# Patient Record
Sex: MALE | Race: WHITE | NOT HISPANIC OR LATINO | Employment: OTHER | URBAN - NONMETROPOLITAN AREA
[De-identification: names, ages, dates, MRNs, and addresses within clinical notes are randomized per-mention and may not be internally consistent; named-entity substitution may affect disease eponyms.]

---

## 2018-08-18 ENCOUNTER — APPOINTMENT (OUTPATIENT)
Dept: CT IMAGING | Facility: HOSPITAL | Age: 58
End: 2018-08-18

## 2018-08-18 ENCOUNTER — APPOINTMENT (OUTPATIENT)
Dept: GENERAL RADIOLOGY | Facility: HOSPITAL | Age: 58
End: 2018-08-18

## 2018-08-18 ENCOUNTER — HOSPITAL ENCOUNTER (EMERGENCY)
Facility: HOSPITAL | Age: 58
Discharge: HOME | End: 2018-08-18
Attending: FAMILY MEDICINE | Admitting: FAMILY MEDICINE

## 2018-08-18 VITALS
WEIGHT: 185 LBS | HEIGHT: 73 IN | BODY MASS INDEX: 24.52 KG/M2 | SYSTOLIC BLOOD PRESSURE: 139 MMHG | RESPIRATION RATE: 16 BRPM | TEMPERATURE: 97.9 F | OXYGEN SATURATION: 97 % | HEART RATE: 62 BPM | DIASTOLIC BLOOD PRESSURE: 93 MMHG

## 2018-08-18 DIAGNOSIS — R07.9 CHEST PAIN IN ADULT: Primary | ICD-10-CM

## 2018-08-18 DIAGNOSIS — R07.89 MUSCULOSKELETAL CHEST PAIN: ICD-10-CM

## 2018-08-18 LAB
ALBUMIN SERPL-MCNC: 3.9 G/DL (ref 3.5–5)
ALBUMIN/GLOB SERPL: 1.4 G/DL (ref 1.5–2.5)
ALP SERPL-CCNC: 51 U/L (ref 40–129)
ALT SERPL W P-5'-P-CCNC: 21 U/L (ref 10–44)
ANION GAP SERPL CALCULATED.3IONS-SCNC: 2.5 MMOL/L (ref 3.6–11.2)
AST SERPL-CCNC: 21 U/L (ref 10–34)
BASOPHILS # BLD AUTO: 0.05 10*3/MM3 (ref 0–0.3)
BASOPHILS NFR BLD AUTO: 0.4 % (ref 0–2)
BILIRUB SERPL-MCNC: 0.3 MG/DL (ref 0.2–1.8)
BNP SERPL-MCNC: 123 PG/ML (ref 0–100)
BUN BLD-MCNC: 10 MG/DL (ref 7–21)
BUN/CREAT SERPL: 12.8 (ref 7–25)
CALCIUM SPEC-SCNC: 9.3 MG/DL (ref 7.7–10)
CHLORIDE SERPL-SCNC: 110 MMOL/L (ref 99–112)
CO2 SERPL-SCNC: 28.5 MMOL/L (ref 24.3–31.9)
CREAT BLD-MCNC: 0.78 MG/DL (ref 0.43–1.29)
CRP SERPL-MCNC: 0.94 MG/DL (ref 0–0.99)
D DIMER PPP FEU-MCNC: 0.53 MCGFEU/ML (ref 0–0.5)
D-LACTATE SERPL-SCNC: 1.3 MMOL/L (ref 0.5–2)
DEPRECATED RDW RBC AUTO: 48.3 FL (ref 37–54)
EOSINOPHIL # BLD AUTO: 0.58 10*3/MM3 (ref 0–0.7)
EOSINOPHIL NFR BLD AUTO: 4.3 % (ref 0–5)
ERYTHROCYTE [DISTWIDTH] IN BLOOD BY AUTOMATED COUNT: 14.6 % (ref 11.5–14.5)
GFR SERPL CREATININE-BSD FRML MDRD: 102 ML/MIN/1.73
GLOBULIN UR ELPH-MCNC: 2.8 GM/DL
GLUCOSE BLD-MCNC: 133 MG/DL (ref 70–110)
HCT VFR BLD AUTO: 37.5 % (ref 42–52)
HGB BLD-MCNC: 11.7 G/DL (ref 14–18)
HOLD SPECIMEN: NORMAL
HOLD SPECIMEN: NORMAL
IMM GRANULOCYTES # BLD: 0.05 10*3/MM3 (ref 0–0.03)
IMM GRANULOCYTES NFR BLD: 0.4 % (ref 0–0.5)
LYMPHOCYTES # BLD AUTO: 2.53 10*3/MM3 (ref 1–3)
LYMPHOCYTES NFR BLD AUTO: 18.6 % (ref 21–51)
MCH RBC QN AUTO: 29.6 PG (ref 27–33)
MCHC RBC AUTO-ENTMCNC: 31.2 G/DL (ref 33–37)
MCV RBC AUTO: 94.9 FL (ref 80–94)
MONOCYTES # BLD AUTO: 0.7 10*3/MM3 (ref 0.1–0.9)
MONOCYTES NFR BLD AUTO: 5.1 % (ref 0–10)
NEUTROPHILS # BLD AUTO: 9.72 10*3/MM3 (ref 1.4–6.5)
NEUTROPHILS NFR BLD AUTO: 71.2 % (ref 30–70)
OSMOLALITY SERPL CALC.SUM OF ELEC: 282.2 MOSM/KG (ref 273–305)
PLATELET # BLD AUTO: 270 10*3/MM3 (ref 130–400)
PMV BLD AUTO: 10.9 FL (ref 6–10)
POTASSIUM BLD-SCNC: 4.2 MMOL/L (ref 3.5–5.3)
PROT SERPL-MCNC: 6.7 G/DL (ref 6–8)
RBC # BLD AUTO: 3.95 10*6/MM3 (ref 4.7–6.1)
SODIUM BLD-SCNC: 141 MMOL/L (ref 135–153)
TROPONIN I SERPL-MCNC: <0.006 NG/ML
TROPONIN I SERPL-MCNC: <0.006 NG/ML
WBC NRBC COR # BLD: 13.63 10*3/MM3 (ref 4.5–12.5)
WHOLE BLOOD HOLD SPECIMEN: NORMAL

## 2018-08-18 PROCEDURE — 93005 ELECTROCARDIOGRAM TRACING: CPT | Performed by: FAMILY MEDICINE

## 2018-08-18 PROCEDURE — 71045 X-RAY EXAM CHEST 1 VIEW: CPT | Performed by: RADIOLOGY

## 2018-08-18 PROCEDURE — 99285 EMERGENCY DEPT VISIT HI MDM: CPT

## 2018-08-18 PROCEDURE — 80053 COMPREHEN METABOLIC PANEL: CPT | Performed by: FAMILY MEDICINE

## 2018-08-18 PROCEDURE — 25010000002 KETOROLAC TROMETHAMINE PER 15 MG

## 2018-08-18 PROCEDURE — 94640 AIRWAY INHALATION TREATMENT: CPT

## 2018-08-18 PROCEDURE — 87040 BLOOD CULTURE FOR BACTERIA: CPT | Performed by: FAMILY MEDICINE

## 2018-08-18 PROCEDURE — 87150 DNA/RNA AMPLIFIED PROBE: CPT | Performed by: FAMILY MEDICINE

## 2018-08-18 PROCEDURE — 25010000002 METHYLPREDNISOLONE PER 125 MG: Performed by: FAMILY MEDICINE

## 2018-08-18 PROCEDURE — 0 IOPAMIDOL PER 1 ML: Performed by: FAMILY MEDICINE

## 2018-08-18 PROCEDURE — 93005 ELECTROCARDIOGRAM TRACING: CPT | Performed by: EMERGENCY MEDICINE

## 2018-08-18 PROCEDURE — 84484 ASSAY OF TROPONIN QUANT: CPT | Performed by: FAMILY MEDICINE

## 2018-08-18 PROCEDURE — 87147 CULTURE TYPE IMMUNOLOGIC: CPT | Performed by: FAMILY MEDICINE

## 2018-08-18 PROCEDURE — 85379 FIBRIN DEGRADATION QUANT: CPT | Performed by: FAMILY MEDICINE

## 2018-08-18 PROCEDURE — 71045 X-RAY EXAM CHEST 1 VIEW: CPT

## 2018-08-18 PROCEDURE — 85025 COMPLETE CBC W/AUTO DIFF WBC: CPT | Performed by: FAMILY MEDICINE

## 2018-08-18 PROCEDURE — 96374 THER/PROPH/DIAG INJ IV PUSH: CPT

## 2018-08-18 PROCEDURE — 83880 ASSAY OF NATRIURETIC PEPTIDE: CPT | Performed by: FAMILY MEDICINE

## 2018-08-18 PROCEDURE — 87186 SC STD MICRODIL/AGAR DIL: CPT | Performed by: FAMILY MEDICINE

## 2018-08-18 PROCEDURE — 96375 TX/PRO/DX INJ NEW DRUG ADDON: CPT

## 2018-08-18 PROCEDURE — 87077 CULTURE AEROBIC IDENTIFY: CPT | Performed by: FAMILY MEDICINE

## 2018-08-18 PROCEDURE — 71275 CT ANGIOGRAPHY CHEST: CPT

## 2018-08-18 PROCEDURE — 71275 CT ANGIOGRAPHY CHEST: CPT | Performed by: RADIOLOGY

## 2018-08-18 PROCEDURE — 94799 UNLISTED PULMONARY SVC/PX: CPT

## 2018-08-18 PROCEDURE — 83605 ASSAY OF LACTIC ACID: CPT | Performed by: FAMILY MEDICINE

## 2018-08-18 PROCEDURE — 71100 X-RAY EXAM RIBS UNI 2 VIEWS: CPT

## 2018-08-18 PROCEDURE — 71100 X-RAY EXAM RIBS UNI 2 VIEWS: CPT | Performed by: RADIOLOGY

## 2018-08-18 PROCEDURE — 86140 C-REACTIVE PROTEIN: CPT | Performed by: FAMILY MEDICINE

## 2018-08-18 RX ORDER — OXYCODONE AND ACETAMINOPHEN 10; 325 MG/1; MG/1
1 TABLET ORAL ONCE
Status: COMPLETED | OUTPATIENT
Start: 2018-08-18 | End: 2018-08-18

## 2018-08-18 RX ORDER — METHYLPREDNISOLONE SODIUM SUCCINATE 125 MG/2ML
125 INJECTION, POWDER, LYOPHILIZED, FOR SOLUTION INTRAMUSCULAR; INTRAVENOUS ONCE
Status: COMPLETED | OUTPATIENT
Start: 2018-08-18 | End: 2018-08-18

## 2018-08-18 RX ORDER — LIDOCAINE 50 MG/G
1 PATCH TOPICAL ONCE
Status: DISCONTINUED | OUTPATIENT
Start: 2018-08-18 | End: 2018-08-18 | Stop reason: HOSPADM

## 2018-08-18 RX ORDER — LISINOPRIL 10 MG/1
10 TABLET ORAL DAILY
COMMUNITY

## 2018-08-18 RX ORDER — KETOROLAC TROMETHAMINE 30 MG/ML
30 INJECTION, SOLUTION INTRAMUSCULAR; INTRAVENOUS ONCE
Status: COMPLETED | OUTPATIENT
Start: 2018-08-18 | End: 2018-08-18

## 2018-08-18 RX ORDER — KETOROLAC TROMETHAMINE 30 MG/ML
INJECTION, SOLUTION INTRAMUSCULAR; INTRAVENOUS
Status: COMPLETED
Start: 2018-08-18 | End: 2018-08-18

## 2018-08-18 RX ORDER — KETOROLAC TROMETHAMINE 30 MG/ML
30 INJECTION, SOLUTION INTRAMUSCULAR; INTRAVENOUS ONCE
Status: DISCONTINUED | OUTPATIENT
Start: 2018-08-18 | End: 2018-08-18

## 2018-08-18 RX ORDER — IPRATROPIUM BROMIDE AND ALBUTEROL SULFATE 2.5; .5 MG/3ML; MG/3ML
3 SOLUTION RESPIRATORY (INHALATION) ONCE
Status: COMPLETED | OUTPATIENT
Start: 2018-08-18 | End: 2018-08-18

## 2018-08-18 RX ORDER — SODIUM CHLORIDE 0.9 % (FLUSH) 0.9 %
10 SYRINGE (ML) INJECTION AS NEEDED
Status: DISCONTINUED | OUTPATIENT
Start: 2018-08-18 | End: 2018-08-18 | Stop reason: HOSPADM

## 2018-08-18 RX ORDER — SODIUM CHLORIDE 0.9 % (FLUSH) 0.9 %
10 SYRINGE (ML) INJECTION AS NEEDED
Status: DISCONTINUED | OUTPATIENT
Start: 2018-08-18 | End: 2018-08-18

## 2018-08-18 RX ADMIN — IPRATROPIUM BROMIDE AND ALBUTEROL SULFATE 3 ML: .5; 3 SOLUTION RESPIRATORY (INHALATION) at 15:20

## 2018-08-18 RX ADMIN — METHYLPREDNISOLONE SODIUM SUCCINATE 125 MG: 125 INJECTION, POWDER, FOR SOLUTION INTRAMUSCULAR; INTRAVENOUS at 15:50

## 2018-08-18 RX ADMIN — KETOROLAC TROMETHAMINE 30 MG: 30 INJECTION, SOLUTION INTRAMUSCULAR; INTRAVENOUS at 19:16

## 2018-08-18 RX ADMIN — SODIUM CHLORIDE 1000 ML: 9 INJECTION, SOLUTION INTRAVENOUS at 15:51

## 2018-08-18 RX ADMIN — OXYCODONE HYDROCHLORIDE AND ACETAMINOPHEN 1 TABLET: 10; 325 TABLET ORAL at 15:49

## 2018-08-18 RX ADMIN — IOPAMIDOL 90 ML: 755 INJECTION, SOLUTION INTRAVENOUS at 17:47

## 2018-08-18 RX ADMIN — LIDOCAINE 1 PATCH: 50 PATCH CUTANEOUS at 16:00

## 2018-08-18 NOTE — ED NOTES
Faxed outpatient stress test to scheduling. Copy is on the chart. Verified patients phone number.      Symes, Heather  08/18/18 3547

## 2018-08-18 NOTE — ED NOTES
Pt left the ED with stress test order form in hand with verbalized understanding of follow up instructions.      Katya Narvaez RN  08/18/18 [id number]

## 2018-08-18 NOTE — ED NOTES
Pt reports that 2 days ago he picked up a spare tire and after that began to have pain in his left rib and pain in left rib/lung area when he takes in a deep breath. MARK. Family at bedside     Vick, Jeannette Florence, RN  08/18/18 [id number]

## 2018-08-18 NOTE — ED PROVIDER NOTES
Subjective   History of Present Illness  57 y/o M here w/ CP for several hours. Pt states that the pain is worse on the L side, worse with deep breathing or positioning, and has no relieving factors. Pt states he was working at home when the pain started. Pt has no personal h/o MI or CAD but has strong family h/o CAD. Pt has never had stent or previous stress test. Pt has h/o COPD and 40+ py smoking history. Pt denies wheeze or worsening SOB. Pt does not wear O2.   Review of Systems   Constitutional: Negative for chills, fatigue and fever.   Eyes: Negative for photophobia and visual disturbance.   Respiratory: Positive for cough and shortness of breath. Negative for chest tightness and wheezing.    Cardiovascular: Positive for chest pain. Negative for palpitations.   Gastrointestinal: Negative for abdominal distention, abdominal pain, constipation, diarrhea, nausea and vomiting.   Genitourinary: Negative for difficulty urinating and dysuria.   Musculoskeletal: Negative for back pain and neck pain.   Skin: Negative for color change and pallor.   Neurological: Negative for headaches.   Hematological: Does not bruise/bleed easily.   All other systems reviewed and are negative.      Past Medical History:   Diagnosis Date   • COPD (chronic obstructive pulmonary disease) (CMS/[facility])    • Hypertension        Allergies   Allergen Reactions   • Demerol [Meperidine] Anxiety       History reviewed. No pertinent surgical history.    History reviewed. No pertinent family history.    Social History     Social History   • Marital status:      Social History Main Topics   • Smoking status: Current Every Day Smoker     Packs/day: 2.00     Types: Cigarettes   • Alcohol use No   • Drug use: No   • Sexual activity: Defer     Other Topics Concern   • Not on file           Objective   Physical Exam   Constitutional: He is oriented to person, place, and time. He appears well-developed and well-nourished. He is active.   HENT:   Head:  Normocephalic and atraumatic.   Right Ear: Hearing, external ear and ear canal normal.   Left Ear: Hearing, external ear and ear canal normal.   Nose: Nose normal.   Mouth/Throat: Uvula is midline, oropharynx is clear and moist and mucous membranes are normal.   Eyes: Pupils are equal, round, and reactive to light. Conjunctivae, EOM and lids are normal.   Neck: Trachea normal, normal range of motion, full passive range of motion without pain and phonation normal. Neck supple.   Cardiovascular: Normal rate, regular rhythm and normal heart sounds.    Pulmonary/Chest: Effort normal and breath sounds normal. He exhibits tenderness.       Abdominal: Soft. Normal appearance.   Neurological: He is alert and oriented to person, place, and time. GCS eye subscore is 4. GCS verbal subscore is 5. GCS motor subscore is 6.   Skin: Skin is warm, dry and intact. Capillary refill takes less than 2 seconds.   Psychiatric: He has a normal mood and affect. His speech is normal and behavior is normal. Cognition and memory are normal.   Nursing note and vitals reviewed.      Procedures           ED Course  ED Course as of Aug 18 1906   Sat Aug 18, 2018   1559 NSR, 81 bpm. QTc 415ms. No ST segment abnormalities concerning for STEMI. No pathologic blocks or dysrhythmias.  ECG 12 Lead [BR]      ED Course User Index  [BR] Gregg Dominguez MD      1904-HEART of 2, pt given referral for outpt stress test. No rib fracture or PE. Pt told to f/u with PCP, continue OTC NSAIDs, and start ASA 81mg daily.             MDM  Number of Diagnoses or Management Options  Chest pain in adult: new and requires workup  Musculoskeletal chest pain: new and requires workup     Amount and/or Complexity of Data Reviewed  Clinical lab tests: reviewed and ordered  Tests in the radiology section of CPT®: reviewed and ordered  Tests in the medicine section of CPT®: reviewed and ordered  Independent visualization of images, tracings, or specimens: yes    Risk  of Complications, Morbidity, and/or Mortality  Presenting problems: high  Diagnostic procedures: high  Management options: high    Patient Progress  Patient progress: stable        Final diagnoses:   Chest pain in adult   Musculoskeletal chest pain            Gregg Dominguez MD  08/18/18 4046

## 2018-08-20 ENCOUNTER — TRANSCRIBE ORDERS (OUTPATIENT)
Dept: ADMINISTRATIVE | Facility: HOSPITAL | Age: 58
End: 2018-08-20

## 2018-08-20 DIAGNOSIS — R07.9 CHEST PAIN, UNSPECIFIED TYPE: Primary | ICD-10-CM

## 2018-08-20 LAB — BACTERIA BLD CULT: NORMAL

## 2018-08-23 LAB
BACTERIA SPEC AEROBE CULT: ABNORMAL
BACTERIA SPEC AEROBE CULT: NORMAL
GRAM STN SPEC: ABNORMAL
ISOLATED FROM: ABNORMAL

## 2025-01-12 ENCOUNTER — HOSPITAL ENCOUNTER (EMERGENCY)
Facility: HOSPITAL | Age: 65
Discharge: HOME | End: 2025-01-12
Attending: EMERGENCY MEDICINE | Admitting: EMERGENCY MEDICINE
Payer: MEDICARE

## 2025-01-12 ENCOUNTER — APPOINTMENT (OUTPATIENT)
Dept: CT IMAGING | Facility: HOSPITAL | Age: 65
End: 2025-01-12
Payer: MEDICARE

## 2025-01-12 VITALS
BODY MASS INDEX: 23.19 KG/M2 | HEIGHT: 73 IN | TEMPERATURE: 98.5 F | DIASTOLIC BLOOD PRESSURE: 62 MMHG | WEIGHT: 175 LBS | SYSTOLIC BLOOD PRESSURE: 101 MMHG | OXYGEN SATURATION: 92 % | HEART RATE: 66 BPM | RESPIRATION RATE: 14 BRPM

## 2025-01-12 DIAGNOSIS — L03.211 FACIAL CELLULITIS: Primary | ICD-10-CM

## 2025-01-12 LAB
ALBUMIN SERPL-MCNC: 3.7 G/DL (ref 3.5–5.2)
ALBUMIN/GLOB SERPL: 1.3 G/DL
ALP SERPL-CCNC: 61 U/L (ref 39–117)
ALT SERPL W P-5'-P-CCNC: 12 U/L (ref 1–41)
ANION GAP SERPL CALCULATED.3IONS-SCNC: 7.7 MMOL/L (ref 5–15)
AST SERPL-CCNC: 17 U/L (ref 1–40)
BASOPHILS # BLD AUTO: 0.05 10*3/MM3 (ref 0–0.2)
BASOPHILS NFR BLD AUTO: 0.5 % (ref 0–1.5)
BILIRUB SERPL-MCNC: 0.2 MG/DL (ref 0–1.2)
BUN SERPL-MCNC: 12 MG/DL (ref 8–23)
BUN/CREAT SERPL: 13.2 (ref 7–25)
CALCIUM SPEC-SCNC: 9 MG/DL (ref 8.6–10.5)
CHLORIDE SERPL-SCNC: 103 MMOL/L (ref 98–107)
CO2 SERPL-SCNC: 28.3 MMOL/L (ref 22–29)
CREAT SERPL-MCNC: 0.91 MG/DL (ref 0.76–1.27)
CRP SERPL-MCNC: 7.03 MG/DL (ref 0–0.5)
DEPRECATED RDW RBC AUTO: 55.5 FL (ref 37–54)
EGFRCR SERPLBLD CKD-EPI 2021: 93.5 ML/MIN/1.73
EOSINOPHIL # BLD AUTO: 0.32 10*3/MM3 (ref 0–0.4)
EOSINOPHIL NFR BLD AUTO: 3.3 % (ref 0.3–6.2)
ERYTHROCYTE [DISTWIDTH] IN BLOOD BY AUTOMATED COUNT: 15.7 % (ref 12.3–15.4)
GLOBULIN UR ELPH-MCNC: 2.9 GM/DL
GLUCOSE SERPL-MCNC: 100 MG/DL (ref 65–99)
HCT VFR BLD AUTO: 33.6 % (ref 37.5–51)
HGB BLD-MCNC: 10.2 G/DL (ref 13–17.7)
IMM GRANULOCYTES # BLD AUTO: 0.06 10*3/MM3 (ref 0–0.05)
IMM GRANULOCYTES NFR BLD AUTO: 0.6 % (ref 0–0.5)
LYMPHOCYTES # BLD AUTO: 2.07 10*3/MM3 (ref 0.7–3.1)
LYMPHOCYTES NFR BLD AUTO: 21.1 % (ref 19.6–45.3)
MCH RBC QN AUTO: 29.2 PG (ref 26.6–33)
MCHC RBC AUTO-ENTMCNC: 30.4 G/DL (ref 31.5–35.7)
MCV RBC AUTO: 96.3 FL (ref 79–97)
MONOCYTES # BLD AUTO: 0.94 10*3/MM3 (ref 0.1–0.9)
MONOCYTES NFR BLD AUTO: 9.6 % (ref 5–12)
NEUTROPHILS NFR BLD AUTO: 6.35 10*3/MM3 (ref 1.7–7)
NEUTROPHILS NFR BLD AUTO: 64.9 % (ref 42.7–76)
NRBC BLD AUTO-RTO: 0 /100 WBC (ref 0–0.2)
PLATELET # BLD AUTO: 229 10*3/MM3 (ref 140–450)
PMV BLD AUTO: 10.6 FL (ref 6–12)
POTASSIUM SERPL-SCNC: 4.2 MMOL/L (ref 3.5–5.2)
PROT SERPL-MCNC: 6.6 G/DL (ref 6–8.5)
RBC # BLD AUTO: 3.49 10*6/MM3 (ref 4.14–5.8)
SODIUM SERPL-SCNC: 139 MMOL/L (ref 136–145)
WBC NRBC COR # BLD AUTO: 9.79 10*3/MM3 (ref 3.4–10.8)

## 2025-01-12 PROCEDURE — 25010000002 CLINDAMYCIN 600 MG/50ML SOLUTION: Performed by: EMERGENCY MEDICINE

## 2025-01-12 PROCEDURE — 25010000002 ONDANSETRON PER 1 MG: Performed by: EMERGENCY MEDICINE

## 2025-01-12 PROCEDURE — 86140 C-REACTIVE PROTEIN: CPT | Performed by: EMERGENCY MEDICINE

## 2025-01-12 PROCEDURE — 80053 COMPREHEN METABOLIC PANEL: CPT | Performed by: EMERGENCY MEDICINE

## 2025-01-12 PROCEDURE — 96375 TX/PRO/DX INJ NEW DRUG ADDON: CPT

## 2025-01-12 PROCEDURE — 99285 EMERGENCY DEPT VISIT HI MDM: CPT

## 2025-01-12 PROCEDURE — 36415 COLL VENOUS BLD VENIPUNCTURE: CPT

## 2025-01-12 PROCEDURE — 70487 CT MAXILLOFACIAL W/DYE: CPT | Performed by: RADIOLOGY

## 2025-01-12 PROCEDURE — 25010000002 HYDROMORPHONE 1 MG/ML SOLUTION: Performed by: EMERGENCY MEDICINE

## 2025-01-12 PROCEDURE — 96365 THER/PROPH/DIAG IV INF INIT: CPT

## 2025-01-12 PROCEDURE — 25510000001 IOPAMIDOL 61 % SOLUTION: Performed by: EMERGENCY MEDICINE

## 2025-01-12 PROCEDURE — 70487 CT MAXILLOFACIAL W/DYE: CPT

## 2025-01-12 PROCEDURE — 85025 COMPLETE CBC W/AUTO DIFF WBC: CPT | Performed by: EMERGENCY MEDICINE

## 2025-01-12 RX ORDER — IOPAMIDOL 612 MG/ML
100 INJECTION, SOLUTION INTRAVASCULAR
Status: COMPLETED | OUTPATIENT
Start: 2025-01-12 | End: 2025-01-12

## 2025-01-12 RX ORDER — SODIUM CHLORIDE 0.9 % (FLUSH) 0.9 %
10 SYRINGE (ML) INJECTION AS NEEDED
Status: DISCONTINUED | OUTPATIENT
Start: 2025-01-12 | End: 2025-01-12 | Stop reason: HOSPADM

## 2025-01-12 RX ORDER — ONDANSETRON 2 MG/ML
4 INJECTION INTRAMUSCULAR; INTRAVENOUS ONCE
Status: COMPLETED | OUTPATIENT
Start: 2025-01-12 | End: 2025-01-12

## 2025-01-12 RX ORDER — OXYCODONE AND ACETAMINOPHEN 5; 325 MG/1; MG/1
1 TABLET ORAL EVERY 6 HOURS PRN
Status: DISCONTINUED | OUTPATIENT
Start: 2025-01-12 | End: 2025-01-12 | Stop reason: HOSPADM

## 2025-01-12 RX ORDER — HYDROCODONE BITARTRATE AND ACETAMINOPHEN 5; 325 MG/1; MG/1
1 TABLET ORAL EVERY 6 HOURS PRN
Qty: 10 TABLET | Refills: 0 | Status: SHIPPED | OUTPATIENT
Start: 2025-01-12

## 2025-01-12 RX ORDER — CEPHALEXIN 500 MG/1
500 CAPSULE ORAL 3 TIMES DAILY
Qty: 30 CAPSULE | Refills: 0 | Status: SHIPPED | OUTPATIENT
Start: 2025-01-12 | End: 2025-01-22

## 2025-01-12 RX ORDER — DOXYCYCLINE 100 MG/1
100 CAPSULE ORAL 2 TIMES DAILY
Qty: 20 CAPSULE | Refills: 0 | Status: SHIPPED | OUTPATIENT
Start: 2025-01-12 | End: 2025-01-22

## 2025-01-12 RX ORDER — CLINDAMYCIN PHOSPHATE 600 MG/50ML
600 INJECTION, SOLUTION INTRAVENOUS ONCE
Status: COMPLETED | OUTPATIENT
Start: 2025-01-12 | End: 2025-01-12

## 2025-01-12 RX ADMIN — IOPAMIDOL 80 ML: 612 INJECTION, SOLUTION INTRAVENOUS at 15:11

## 2025-01-12 RX ADMIN — HYDROMORPHONE HYDROCHLORIDE 1 MG: 1 INJECTION, SOLUTION INTRAMUSCULAR; INTRAVENOUS; SUBCUTANEOUS at 15:16

## 2025-01-12 RX ADMIN — ONDANSETRON 4 MG: 2 INJECTION INTRAMUSCULAR; INTRAVENOUS at 14:07

## 2025-01-12 RX ADMIN — OXYCODONE HYDROCHLORIDE AND ACETAMINOPHEN 1 TABLET: 5; 325 TABLET ORAL at 17:58

## 2025-01-12 RX ADMIN — CLINDAMYCIN IN 5 PERCENT DEXTROSE 600 MG: 12 INJECTION, SOLUTION INTRAVENOUS at 14:08

## 2025-01-15 NOTE — ED PROVIDER NOTES
Subjective     History provided by:  Patient   used: No    Facial Pain  Location:  Left Lower Face Cellultis  Quality:  5/10 pain that is dull ache and constant  Severity:  Mild  Onset quality:  Gradual  Duration:  2 days  Timing:  Constant  Progression:  Worsening  Chronicity:  New  Associated symptoms: no abdominal pain, no chest pain, no congestion, no cough, no diarrhea, no ear pain, no fatigue, no fever, no headaches, no loss of consciousness, no myalgias, no nausea, no rash, no rhinorrhea, no shortness of breath, no sore throat, no vomiting and no wheezing        Review of Systems   Constitutional:  Negative for activity change, appetite change, chills, diaphoresis, fatigue and fever.   HENT:  Negative for congestion, ear pain, rhinorrhea and sore throat.    Eyes:  Negative for redness.   Respiratory:  Negative for cough, chest tightness, shortness of breath and wheezing.    Cardiovascular:  Negative for chest pain, palpitations and leg swelling.   Gastrointestinal:  Negative for abdominal pain, diarrhea, nausea and vomiting.   Genitourinary:  Negative for dysuria and urgency.   Musculoskeletal:  Negative for arthralgias, back pain, myalgias and neck pain.   Skin:  Negative for pallor, rash and wound.   Neurological:  Negative for dizziness, loss of consciousness, speech difficulty, weakness and headaches.   Psychiatric/Behavioral:  Negative for agitation, behavioral problems, confusion and decreased concentration.    All other systems reviewed and are negative.      Past Medical History:   Diagnosis Date    COPD (chronic obstructive pulmonary disease)     Hypertension        Allergies   Allergen Reactions    Codeine GI Intolerance    Demerol [Meperidine] Anxiety       No past surgical history on file.    No family history on file.    Social History     Socioeconomic History    Marital status:    Tobacco Use    Smoking status: Every Day     Current packs/day: 2.00     Types:  Cigarettes   Substance and Sexual Activity    Alcohol use: No    Drug use: No    Sexual activity: Defer           Objective   Physical Exam  Vitals and nursing note reviewed.   Constitutional:       General: He is not in acute distress.     Appearance: Normal appearance. He is well-developed. He is not toxic-appearing or diaphoretic.   HENT:      Head: Normocephalic and atraumatic.        Right Ear: External ear normal.      Left Ear: External ear normal.      Nose: Nose normal.      Mouth/Throat:      Pharynx: No oropharyngeal exudate.      Tonsils: No tonsillar exudate.   Eyes:      General: Lids are normal.      Conjunctiva/sclera: Conjunctivae normal.      Pupils: Pupils are equal, round, and reactive to light.   Neck:      Thyroid: No thyromegaly.   Cardiovascular:      Rate and Rhythm: Normal rate and regular rhythm.      Pulses: Normal pulses.      Heart sounds: Normal heart sounds, S1 normal and S2 normal.   Pulmonary:      Effort: Pulmonary effort is normal. No tachypnea or respiratory distress.      Breath sounds: Normal breath sounds. No decreased breath sounds, wheezing or rales.   Chest:      Chest wall: No tenderness.   Abdominal:      General: Bowel sounds are normal. There is no distension.      Palpations: Abdomen is soft.      Tenderness: There is no abdominal tenderness. There is no guarding or rebound.   Musculoskeletal:         General: No tenderness or deformity. Normal range of motion.      Cervical back: Full passive range of motion without pain, normal range of motion and neck supple.   Lymphadenopathy:      Cervical: No cervical adenopathy.   Skin:     General: Skin is warm and dry.      Coloration: Skin is not pale.      Findings: No erythema or rash.   Neurological:      Mental Status: He is alert and oriented to person, place, and time.      GCS: GCS eye subscore is 4. GCS verbal subscore is 5. GCS motor subscore is 6.      Cranial Nerves: No cranial nerve deficit.      Sensory: No  sensory deficit.   Psychiatric:         Speech: Speech normal.         Behavior: Behavior normal.         Thought Content: Thought content normal.         Judgment: Judgment normal.         Procedures           ED Course  ED Course as of 01/15/25 1402   Sun Jan 12, 2025   1653 CT Facial Bones With Contrast  IMPRESSION:  Cellulitis involving the soft tissues adjacent to the LEFT mandible  ramus   [ES]      ED Course User Index  [ES] Shday Nugent MD                                                       Medical Decision Making      Final diagnoses:   Facial cellulitis       ED Disposition  ED Disposition       ED Disposition   Discharge    Condition   Stable    Comment   --               Reynaldo Rodriguez MD  [address] [facility] [address]  [facility] [address]  [phone]    Schedule an appointment as soon as possible for a visit in 1 day  EVALUATE         Medication List        New Prescriptions      cephalexin 500 MG capsule  Commonly known as: KEFLEX  Take 1 capsule by mouth 3 (Three) Times a Day for 10 days.     doxycycline 100 MG capsule  Commonly known as: MONODOX  Take 1 capsule by mouth 2 (Two) Times a Day for 10 days.     HYDROcodone-acetaminophen 5-325 MG per tablet  Commonly known as: NORCO  Take 1 tablet by mouth Every 6 (Six) Hours As Needed for Severe Pain.               Where to Get Your Medications        These medications were sent to [facility] #[id number] - [address] AT [facility] [address] [facility] - [phone]  - [phone] [address] [address]      Phone: [phone]   cephalexin 500 MG capsule  doxycycline 100 MG capsule  HYDROcodone-acetaminophen 5-325 MG per tablet            Shady Nugent MD  01/15/25 1402

## 2025-01-29 ENCOUNTER — APPOINTMENT (OUTPATIENT)
Dept: GENERAL RADIOLOGY | Facility: HOSPITAL | Age: 65
End: 2025-01-29
Payer: MEDICARE

## 2025-01-29 ENCOUNTER — APPOINTMENT (OUTPATIENT)
Dept: CT IMAGING | Facility: HOSPITAL | Age: 65
End: 2025-01-29
Payer: MEDICARE

## 2025-01-29 ENCOUNTER — APPOINTMENT (OUTPATIENT)
Dept: ULTRASOUND IMAGING | Facility: HOSPITAL | Age: 65
End: 2025-01-29
Payer: MEDICARE

## 2025-01-29 ENCOUNTER — HOSPITAL ENCOUNTER (INPATIENT)
Facility: HOSPITAL | Age: 65
LOS: 3 days | Discharge: HOME | End: 2025-02-01
Admitting: INTERNAL MEDICINE
Payer: MEDICARE

## 2025-01-29 DIAGNOSIS — I50.9 ACUTE CONGESTIVE HEART FAILURE, UNSPECIFIED HEART FAILURE TYPE: Primary | ICD-10-CM

## 2025-01-29 DIAGNOSIS — J96.01 ACUTE RESPIRATORY FAILURE WITH HYPOXIA: ICD-10-CM

## 2025-01-29 DIAGNOSIS — G62.9 PERIPHERAL POLYNEUROPATHY: ICD-10-CM

## 2025-01-29 DIAGNOSIS — B37.0 THRUSH: ICD-10-CM

## 2025-01-29 LAB
A-A DO2: 36.6 MMHG (ref 0–300)
ALBUMIN SERPL-MCNC: 3.7 G/DL (ref 3.5–5.2)
ALBUMIN/GLOB SERPL: 1.2 G/DL
ALP SERPL-CCNC: 61 U/L (ref 39–117)
ALT SERPL W P-5'-P-CCNC: 12 U/L (ref 1–41)
ANION GAP SERPL CALCULATED.3IONS-SCNC: 8.9 MMOL/L (ref 5–15)
APTT PPP: 31.9 SECONDS (ref 24.5–35.9)
ARTERIAL PATENCY WRIST A: POSITIVE
AST SERPL-CCNC: 19 U/L (ref 1–40)
ATMOSPHERIC PRESS: 726 MMHG
BASE EXCESS BLDA CALC-SCNC: 1.6 MMOL/L (ref 0–2)
BASOPHILS # BLD AUTO: 0.07 10*3/MM3 (ref 0–0.2)
BASOPHILS NFR BLD AUTO: 0.4 % (ref 0–1.5)
BDY SITE: ABNORMAL
BILIRUB SERPL-MCNC: 0.4 MG/DL (ref 0–1.2)
BILIRUB UR QL STRIP: NEGATIVE
BUN SERPL-MCNC: 16 MG/DL (ref 8–23)
BUN/CREAT SERPL: 20.5 (ref 7–25)
CALCIUM SPEC-SCNC: 8.8 MG/DL (ref 8.6–10.5)
CHLORIDE SERPL-SCNC: 108 MMOL/L (ref 98–107)
CLARITY UR: CLEAR
CO2 BLDA-SCNC: 28.8 MMOL/L (ref 22–33)
CO2 SERPL-SCNC: 24.1 MMOL/L (ref 22–29)
COHGB MFR BLD: 6.9 % (ref 0–5)
COLOR UR: YELLOW
CREAT SERPL-MCNC: 0.78 MG/DL (ref 0.76–1.27)
CRP SERPL-MCNC: 4.05 MG/DL (ref 0–0.5)
D-LACTATE SERPL-SCNC: 0.6 MMOL/L (ref 0.5–2)
DEPRECATED RDW RBC AUTO: 58.8 FL (ref 37–54)
EGFRCR SERPLBLD CKD-EPI 2021: 99 ML/MIN/1.73
EOSINOPHIL # BLD AUTO: 0.33 10*3/MM3 (ref 0–0.4)
EOSINOPHIL NFR BLD AUTO: 1.9 % (ref 0.3–6.2)
ERYTHROCYTE [DISTWIDTH] IN BLOOD BY AUTOMATED COUNT: 16.8 % (ref 12.3–15.4)
ERYTHROCYTE [SEDIMENTATION RATE] IN BLOOD: 28 MM/HR (ref 0–20)
FLUAV RNA RESP QL NAA+PROBE: NOT DETECTED
FLUBV RNA RESP QL NAA+PROBE: NOT DETECTED
GEN 5 1HR TROPONIN T REFLEX: 22 NG/L
GLOBULIN UR ELPH-MCNC: 3 GM/DL
GLUCOSE BLDC GLUCOMTR-MCNC: 91 MG/DL (ref 70–130)
GLUCOSE SERPL-MCNC: 86 MG/DL (ref 65–99)
GLUCOSE UR STRIP-MCNC: NEGATIVE MG/DL
HAV IGM SERPL QL IA: NORMAL
HBV CORE IGM SERPL QL IA: NORMAL
HBV SURFACE AG SERPL QL IA: NORMAL
HCO3 BLDA-SCNC: 27.3 MMOL/L (ref 20–26)
HCT VFR BLD AUTO: 32.3 % (ref 37.5–51)
HCT VFR BLD CALC: 30 % (ref 38–51)
HCV AB SER QL: NORMAL
HGB BLD-MCNC: 9.9 G/DL (ref 13–17.7)
HGB BLDA-MCNC: 9.8 G/DL (ref 14–18)
HGB UR QL STRIP.AUTO: NEGATIVE
HIV 1+2 AB+HIV1 P24 AG SERPL QL IA: NORMAL
HOLD SPECIMEN: NORMAL
HOLD SPECIMEN: NORMAL
IMM GRANULOCYTES # BLD AUTO: 0.08 10*3/MM3 (ref 0–0.05)
IMM GRANULOCYTES NFR BLD AUTO: 0.5 % (ref 0–0.5)
INHALED O2 CONCENTRATION: 21 %
INR PPP: 0.95 (ref 0.9–1.1)
KETONES UR QL STRIP: NEGATIVE
LEUKOCYTE ESTERASE UR QL STRIP.AUTO: NEGATIVE
LYMPHOCYTES # BLD AUTO: 2.23 10*3/MM3 (ref 0.7–3.1)
LYMPHOCYTES NFR BLD AUTO: 12.6 % (ref 19.6–45.3)
Lab: ABNORMAL
Lab: ABNORMAL
MAGNESIUM SERPL-MCNC: 1.8 MG/DL (ref 1.6–2.4)
MCH RBC QN AUTO: 29.4 PG (ref 26.6–33)
MCHC RBC AUTO-ENTMCNC: 30.7 G/DL (ref 31.5–35.7)
MCV RBC AUTO: 95.8 FL (ref 79–97)
METHGB BLD QL: 0.6 % (ref 0–3)
MODALITY: ABNORMAL
MONOCYTES # BLD AUTO: 1.14 10*3/MM3 (ref 0.1–0.9)
MONOCYTES NFR BLD AUTO: 6.5 % (ref 5–12)
NEUTROPHILS NFR BLD AUTO: 13.82 10*3/MM3 (ref 1.7–7)
NEUTROPHILS NFR BLD AUTO: 78.1 % (ref 42.7–76)
NITRITE UR QL STRIP: NEGATIVE
NOTIFIED BY: ABNORMAL
NOTIFIED WHO: ABNORMAL
NRBC BLD AUTO-RTO: 0 /100 WBC (ref 0–0.2)
NT-PROBNP SERPL-MCNC: 1805 PG/ML (ref 0–900)
OXYHGB MFR BLDV: 83.2 % (ref 94–99)
PCO2 BLDA: 47.3 MM HG (ref 35–45)
PCO2 TEMP ADJ BLD: ABNORMAL MM[HG]
PH BLDA: 7.37 PH UNITS (ref 7.35–7.45)
PH UR STRIP.AUTO: 6 [PH] (ref 5–8)
PH, TEMP CORRECTED: ABNORMAL
PLATELET # BLD AUTO: 287 10*3/MM3 (ref 140–450)
PMV BLD AUTO: 10.9 FL (ref 6–12)
PO2 BLDA: 52.6 MM HG (ref 83–108)
PO2 TEMP ADJ BLD: ABNORMAL MM[HG]
POTASSIUM SERPL-SCNC: 4.4 MMOL/L (ref 3.5–5.2)
PROCALCITONIN SERPL-MCNC: 0.07 NG/ML (ref 0–0.25)
PROT SERPL-MCNC: 6.7 G/DL (ref 6–8.5)
PROT UR QL STRIP: NEGATIVE
PROTHROMBIN TIME: 12.8 SECONDS (ref 11.6–15.1)
RBC # BLD AUTO: 3.37 10*6/MM3 (ref 4.14–5.8)
S PYO AG THROAT QL: NEGATIVE
SAO2 % BLDCOA: 89.9 % (ref 94–99)
SARS-COV-2 RNA RESP QL NAA+PROBE: NOT DETECTED
SODIUM SERPL-SCNC: 141 MMOL/L (ref 136–145)
SP GR UR STRIP: 1.01 (ref 1–1.03)
T4 FREE SERPL-MCNC: 1.01 NG/DL (ref 0.92–1.68)
TROPONIN T NUMERIC DELTA: 2 NG/L
TROPONIN T SERPL HS-MCNC: 20 NG/L
TSH SERPL DL<=0.05 MIU/L-ACNC: 0.63 UIU/ML (ref 0.27–4.2)
UROBILINOGEN UR QL STRIP: NORMAL
VENTILATOR MODE: ABNORMAL
WBC NRBC COR # BLD AUTO: 17.67 10*3/MM3 (ref 3.4–10.8)
WHOLE BLOOD HOLD COAG: NORMAL
WHOLE BLOOD HOLD SPECIMEN: NORMAL

## 2025-01-29 PROCEDURE — 36415 COLL VENOUS BLD VENIPUNCTURE: CPT

## 2025-01-29 PROCEDURE — 87040 BLOOD CULTURE FOR BACTERIA: CPT | Performed by: PHYSICIAN ASSISTANT

## 2025-01-29 PROCEDURE — 93970 EXTREMITY STUDY: CPT | Performed by: RADIOLOGY

## 2025-01-29 PROCEDURE — 81003 URINALYSIS AUTO W/O SCOPE: CPT | Performed by: PHYSICIAN ASSISTANT

## 2025-01-29 PROCEDURE — 87880 STREP A ASSAY W/OPTIC: CPT | Performed by: PHYSICIAN ASSISTANT

## 2025-01-29 PROCEDURE — 87081 CULTURE SCREEN ONLY: CPT | Performed by: PHYSICIAN ASSISTANT

## 2025-01-29 PROCEDURE — 87636 SARSCOV2 & INF A&B AMP PRB: CPT | Performed by: PHYSICIAN ASSISTANT

## 2025-01-29 PROCEDURE — 25510000001 IOPAMIDOL PER 1 ML

## 2025-01-29 PROCEDURE — 84484 ASSAY OF TROPONIN QUANT: CPT

## 2025-01-29 PROCEDURE — 83735 ASSAY OF MAGNESIUM: CPT | Performed by: PHYSICIAN ASSISTANT

## 2025-01-29 PROCEDURE — 71045 X-RAY EXAM CHEST 1 VIEW: CPT

## 2025-01-29 PROCEDURE — 99223 1ST HOSP IP/OBS HIGH 75: CPT | Performed by: INTERNAL MEDICINE

## 2025-01-29 PROCEDURE — 94799 UNLISTED PULMONARY SVC/PX: CPT

## 2025-01-29 PROCEDURE — 71275 CT ANGIOGRAPHY CHEST: CPT | Performed by: RADIOLOGY

## 2025-01-29 PROCEDURE — 84145 PROCALCITONIN (PCT): CPT | Performed by: PHYSICIAN ASSISTANT

## 2025-01-29 PROCEDURE — 80074 ACUTE HEPATITIS PANEL: CPT | Performed by: PHYSICIAN ASSISTANT

## 2025-01-29 PROCEDURE — 93970 EXTREMITY STUDY: CPT

## 2025-01-29 PROCEDURE — 93010 ELECTROCARDIOGRAM REPORT: CPT | Performed by: INTERNAL MEDICINE

## 2025-01-29 PROCEDURE — 71275 CT ANGIOGRAPHY CHEST: CPT

## 2025-01-29 PROCEDURE — 36600 WITHDRAWAL OF ARTERIAL BLOOD: CPT

## 2025-01-29 PROCEDURE — 25010000002 FUROSEMIDE PER 20 MG: Performed by: PHYSICIAN ASSISTANT

## 2025-01-29 PROCEDURE — 85610 PROTHROMBIN TIME: CPT | Performed by: PHYSICIAN ASSISTANT

## 2025-01-29 PROCEDURE — 84439 ASSAY OF FREE THYROXINE: CPT | Performed by: PHYSICIAN ASSISTANT

## 2025-01-29 PROCEDURE — 99285 EMERGENCY DEPT VISIT HI MDM: CPT

## 2025-01-29 PROCEDURE — 85730 THROMBOPLASTIN TIME PARTIAL: CPT | Performed by: PHYSICIAN ASSISTANT

## 2025-01-29 PROCEDURE — 83605 ASSAY OF LACTIC ACID: CPT | Performed by: PHYSICIAN ASSISTANT

## 2025-01-29 PROCEDURE — 83880 ASSAY OF NATRIURETIC PEPTIDE: CPT

## 2025-01-29 PROCEDURE — 93005 ELECTROCARDIOGRAM TRACING: CPT

## 2025-01-29 PROCEDURE — 84443 ASSAY THYROID STIM HORMONE: CPT | Performed by: PHYSICIAN ASSISTANT

## 2025-01-29 PROCEDURE — 80053 COMPREHEN METABOLIC PANEL: CPT

## 2025-01-29 PROCEDURE — 71045 X-RAY EXAM CHEST 1 VIEW: CPT | Performed by: RADIOLOGY

## 2025-01-29 PROCEDURE — 83050 HGB METHEMOGLOBIN QUAN: CPT

## 2025-01-29 PROCEDURE — 82375 ASSAY CARBOXYHB QUANT: CPT

## 2025-01-29 PROCEDURE — 85652 RBC SED RATE AUTOMATED: CPT | Performed by: PHYSICIAN ASSISTANT

## 2025-01-29 PROCEDURE — 82805 BLOOD GASES W/O2 SATURATION: CPT

## 2025-01-29 PROCEDURE — 25010000002 HEPARIN (PORCINE) PER 1000 UNITS: Performed by: INTERNAL MEDICINE

## 2025-01-29 PROCEDURE — 82948 REAGENT STRIP/BLOOD GLUCOSE: CPT

## 2025-01-29 PROCEDURE — G0432 EIA HIV-1/HIV-2 SCREEN: HCPCS | Performed by: PHYSICIAN ASSISTANT

## 2025-01-29 PROCEDURE — 85025 COMPLETE CBC W/AUTO DIFF WBC: CPT

## 2025-01-29 PROCEDURE — 86140 C-REACTIVE PROTEIN: CPT | Performed by: PHYSICIAN ASSISTANT

## 2025-01-29 RX ORDER — MELOXICAM 15 MG/1
15 TABLET ORAL DAILY PRN
COMMUNITY
Start: 2025-01-06 | End: 2025-02-01 | Stop reason: HOSPADM

## 2025-01-29 RX ORDER — MIRTAZAPINE 45 MG/1
1 TABLET, FILM COATED ORAL DAILY
COMMUNITY
Start: 2024-10-08

## 2025-01-29 RX ORDER — FAMOTIDINE 40 MG/1
40 TABLET, FILM COATED ORAL DAILY
COMMUNITY
Start: 2024-10-08

## 2025-01-29 RX ORDER — SODIUM CHLORIDE 0.9 % (FLUSH) 0.9 %
10 SYRINGE (ML) INJECTION AS NEEDED
Status: DISCONTINUED | OUTPATIENT
Start: 2025-01-29 | End: 2025-02-01 | Stop reason: HOSPADM

## 2025-01-29 RX ORDER — SODIUM CHLORIDE 0.9 % (FLUSH) 0.9 %
10 SYRINGE (ML) INJECTION EVERY 12 HOURS SCHEDULED
Status: DISCONTINUED | OUTPATIENT
Start: 2025-01-29 | End: 2025-02-01 | Stop reason: HOSPADM

## 2025-01-29 RX ORDER — NYSTATIN 100000 [USP'U]/ML
5 SUSPENSION ORAL 4 TIMES DAILY
Status: DISCONTINUED | OUTPATIENT
Start: 2025-01-29 | End: 2025-02-01 | Stop reason: HOSPADM

## 2025-01-29 RX ORDER — HYDROCODONE BITARTRATE AND ACETAMINOPHEN 5; 325 MG/1; MG/1
1 TABLET ORAL EVERY 6 HOURS PRN
Status: DISCONTINUED | OUTPATIENT
Start: 2025-01-29 | End: 2025-01-30

## 2025-01-29 RX ORDER — BISACODYL 5 MG/1
5 TABLET, DELAYED RELEASE ORAL DAILY PRN
Status: DISCONTINUED | OUTPATIENT
Start: 2025-01-29 | End: 2025-02-01 | Stop reason: HOSPADM

## 2025-01-29 RX ORDER — HEPARIN SODIUM 5000 [USP'U]/ML
5000 INJECTION, SOLUTION INTRAVENOUS; SUBCUTANEOUS EVERY 8 HOURS SCHEDULED
Status: DISCONTINUED | OUTPATIENT
Start: 2025-01-29 | End: 2025-02-01 | Stop reason: HOSPADM

## 2025-01-29 RX ORDER — DOXYCYCLINE HYCLATE 100 MG
100 TABLET ORAL 2 TIMES DAILY
COMMUNITY

## 2025-01-29 RX ORDER — FERROUS SULFATE 325(65) MG
325 TABLET ORAL EVERY OTHER DAY
COMMUNITY

## 2025-01-29 RX ORDER — ALBUTEROL SULFATE 90 UG/1
2 INHALANT RESPIRATORY (INHALATION) EVERY 6 HOURS PRN
COMMUNITY

## 2025-01-29 RX ORDER — ROSUVASTATIN CALCIUM 20 MG/1
1 TABLET, COATED ORAL NIGHTLY
COMMUNITY
Start: 2024-10-08

## 2025-01-29 RX ORDER — IOPAMIDOL 755 MG/ML
100 INJECTION, SOLUTION INTRAVASCULAR
Status: COMPLETED | OUTPATIENT
Start: 2025-01-29 | End: 2025-01-29

## 2025-01-29 RX ORDER — BISACODYL 10 MG
10 SUPPOSITORY, RECTAL RECTAL DAILY PRN
Status: DISCONTINUED | OUTPATIENT
Start: 2025-01-29 | End: 2025-02-01 | Stop reason: HOSPADM

## 2025-01-29 RX ORDER — HYDROXYZINE HYDROCHLORIDE 25 MG/1
25 TABLET, FILM COATED ORAL ONCE
Status: COMPLETED | OUTPATIENT
Start: 2025-01-29 | End: 2025-01-29

## 2025-01-29 RX ORDER — GABAPENTIN 600 MG/1
600 TABLET ORAL 3 TIMES DAILY
Status: ON HOLD | COMMUNITY
Start: 2024-10-08 | End: 2025-02-01

## 2025-01-29 RX ORDER — IPRATROPIUM BROMIDE AND ALBUTEROL SULFATE 2.5; .5 MG/3ML; MG/3ML
3 SOLUTION RESPIRATORY (INHALATION)
Status: DISCONTINUED | OUTPATIENT
Start: 2025-01-30 | End: 2025-02-01

## 2025-01-29 RX ORDER — ACETAMINOPHEN 325 MG/1
650 TABLET ORAL EVERY 4 HOURS PRN
Status: DISCONTINUED | OUTPATIENT
Start: 2025-01-29 | End: 2025-02-01 | Stop reason: HOSPADM

## 2025-01-29 RX ORDER — SODIUM CHLORIDE 9 MG/ML
40 INJECTION, SOLUTION INTRAVENOUS AS NEEDED
Status: DISCONTINUED | OUTPATIENT
Start: 2025-01-29 | End: 2025-02-01 | Stop reason: HOSPADM

## 2025-01-29 RX ORDER — SILDENAFIL 100 MG/1
100 TABLET, FILM COATED ORAL DAILY PRN
COMMUNITY
Start: 2024-12-05

## 2025-01-29 RX ORDER — AMOXICILLIN 250 MG
2 CAPSULE ORAL 2 TIMES DAILY PRN
Status: DISCONTINUED | OUTPATIENT
Start: 2025-01-29 | End: 2025-02-01 | Stop reason: HOSPADM

## 2025-01-29 RX ORDER — FUROSEMIDE 10 MG/ML
80 INJECTION INTRAMUSCULAR; INTRAVENOUS 2 TIMES DAILY
Status: DISCONTINUED | OUTPATIENT
Start: 2025-01-30 | End: 2025-01-30

## 2025-01-29 RX ORDER — CLONAZEPAM 0.5 MG/1
0.25 TABLET ORAL 2 TIMES DAILY PRN
COMMUNITY

## 2025-01-29 RX ORDER — CEPHALEXIN 500 MG/1
500 CAPSULE ORAL 3 TIMES DAILY
COMMUNITY
Start: 2024-10-15

## 2025-01-29 RX ORDER — NITROGLYCERIN 0.4 MG/1
0.4 TABLET SUBLINGUAL
Status: DISCONTINUED | OUTPATIENT
Start: 2025-01-29 | End: 2025-01-29

## 2025-01-29 RX ORDER — ACETAMINOPHEN 500 MG
500 TABLET ORAL EVERY 6 HOURS PRN
COMMUNITY

## 2025-01-29 RX ORDER — MELOXICAM 7.5 MG/1
15 TABLET ORAL DAILY PRN
Status: CANCELLED | OUTPATIENT
Start: 2025-01-29

## 2025-01-29 RX ORDER — LISINOPRIL 10 MG/1
10 TABLET ORAL DAILY
Status: DISCONTINUED | OUTPATIENT
Start: 2025-01-30 | End: 2025-02-01

## 2025-01-29 RX ORDER — NITROGLYCERIN 0.4 MG/1
0.4 TABLET SUBLINGUAL
Status: DISCONTINUED | OUTPATIENT
Start: 2025-01-29 | End: 2025-02-01 | Stop reason: HOSPADM

## 2025-01-29 RX ORDER — FUROSEMIDE 10 MG/ML
80 INJECTION INTRAMUSCULAR; INTRAVENOUS ONCE
Status: COMPLETED | OUTPATIENT
Start: 2025-01-29 | End: 2025-01-29

## 2025-01-29 RX ORDER — TAMSULOSIN HYDROCHLORIDE 0.4 MG/1
1 CAPSULE ORAL DAILY
COMMUNITY

## 2025-01-29 RX ORDER — ACETAMINOPHEN 160 MG/5ML
650 SOLUTION ORAL EVERY 4 HOURS PRN
Status: DISCONTINUED | OUTPATIENT
Start: 2025-01-29 | End: 2025-02-01 | Stop reason: HOSPADM

## 2025-01-29 RX ORDER — BUDESONIDE AND FORMOTEROL FUMARATE DIHYDRATE 160; 4.5 UG/1; UG/1
2 AEROSOL RESPIRATORY (INHALATION) 2 TIMES DAILY PRN
COMMUNITY

## 2025-01-29 RX ORDER — POLYETHYLENE GLYCOL 3350 17 G/17G
17 POWDER, FOR SOLUTION ORAL DAILY PRN
Status: DISCONTINUED | OUTPATIENT
Start: 2025-01-29 | End: 2025-02-01 | Stop reason: HOSPADM

## 2025-01-29 RX ORDER — CYANOCOBALAMIN 1000 UG/ML
1000 INJECTION, SOLUTION INTRAMUSCULAR; SUBCUTANEOUS
COMMUNITY

## 2025-01-29 RX ORDER — ACETAMINOPHEN 650 MG/1
650 SUPPOSITORY RECTAL EVERY 4 HOURS PRN
Status: DISCONTINUED | OUTPATIENT
Start: 2025-01-29 | End: 2025-02-01 | Stop reason: HOSPADM

## 2025-01-29 RX ADMIN — NYSTATIN 500000 UNITS: 100000 SUSPENSION ORAL at 21:03

## 2025-01-29 RX ADMIN — FUROSEMIDE 80 MG: 10 INJECTION, SOLUTION INTRAMUSCULAR; INTRAVENOUS at 14:05

## 2025-01-29 RX ADMIN — IOPAMIDOL 70 ML: 755 INJECTION, SOLUTION INTRAVENOUS at 16:04

## 2025-01-29 RX ADMIN — Medication 10 ML: at 21:02

## 2025-01-29 RX ADMIN — HYDROCODONE BITARTRATE AND ACETAMINOPHEN 1 TABLET: 5; 325 TABLET ORAL at 22:36

## 2025-01-29 RX ADMIN — HYDROXYZINE HYDROCHLORIDE 25 MG: 25 TABLET, FILM COATED ORAL at 23:40

## 2025-01-29 RX ADMIN — HEPARIN SODIUM 5000 UNITS: 5000 INJECTION INTRAVENOUS; SUBCUTANEOUS at 21:02

## 2025-01-29 NOTE — ED PROVIDER NOTES
Subjective   History of Present Illness  64-year-old male presents to the ED today for shortness of breath and peripheral edema.  He states his symptoms started yesterday and have worsened today.  He states the shortness of breath is worse if he lays flat and is also worse with exertion.  He denies any chest pain but states he did have some pain in his left arm earlier today.  He states he does have a history of COPD but does not wear home oxygen.  He states he uses some inhalers at home for this.  He does smoke a pack of cigarettes per day.  The patient also reports that he has a rash in his mouth that feels like it goes down his throat.  He states this also started yesterday.  He states that has made his voice feel hoarse.  He does use a steroid inhaler.  He does not think he has been on any steroid pills recently.  He does have a history of chronic pain, hypertension, hyperlipidemia and GERD.  He denies any history of diabetes or coronary artery disease.    History provided by:  Patient  Shortness of Breath  Severity:  Moderate  Onset quality:  Gradual  Duration:  1 day  Timing:  Constant  Progression:  Worsening  Chronicity:  New  Relieved by:  Nothing  Worsened by:  Exertion and activity  Associated symptoms: sore throat    Associated symptoms: no abdominal pain, no chest pain, no cough, no diaphoresis, no ear pain, no fever, no headaches, no hemoptysis, no neck pain, no rash, no sputum production, no syncope, no swollen glands, no vomiting and no wheezing    Risk factors: tobacco use        Review of Systems   Constitutional:  Negative for diaphoresis and fever.   HENT:  Positive for mouth sores, sore throat and voice change. Negative for ear pain and trouble swallowing.    Eyes: Negative.    Respiratory:  Positive for shortness of breath. Negative for cough, hemoptysis, sputum production and wheezing.    Cardiovascular:  Positive for leg swelling. Negative for chest pain, palpitations and syncope.    Gastrointestinal: Negative.  Negative for abdominal pain and vomiting.   Genitourinary: Negative.    Musculoskeletal:  Negative for neck pain.   Skin: Negative.  Negative for rash.   Neurological:  Negative for headaches.   Psychiatric/Behavioral: Negative.     All other systems reviewed and are negative.      Past Medical History:   Diagnosis Date    COPD (chronic obstructive pulmonary disease)     Hypertension        Allergies   Allergen Reactions    Codeine GI Intolerance    Demerol [Meperidine] Anxiety       No past surgical history on file.    No family history on file.    Social History     Socioeconomic History    Marital status:    Tobacco Use    Smoking status: Every Day     Current packs/day: 2.00     Types: Cigarettes   Substance and Sexual Activity    Alcohol use: No    Drug use: No    Sexual activity: Defer           Objective   Physical Exam  Vitals and nursing note reviewed.   Constitutional:       General: He is not in acute distress.     Appearance: He is well-developed. He is not diaphoretic.   HENT:      Head: Normocephalic and atraumatic.      Mouth/Throat:      Mouth: Mucous membranes are moist.      Comments: Patient appears to have thrush in his mouth, white plaques noted on his tongue and in his posterior oropharynx, voice is mildly hoarse  Eyes:      Extraocular Movements: Extraocular movements intact.      Pupils: Pupils are equal, round, and reactive to light.   Neck:      Vascular: No JVD.      Trachea: No tracheal deviation.   Cardiovascular:      Rate and Rhythm: Normal rate and regular rhythm.      Pulses: Normal pulses.      Heart sounds: Normal heart sounds.   Pulmonary:      Effort: Pulmonary effort is normal.      Breath sounds: Normal breath sounds.   Chest:      Chest wall: No tenderness.   Abdominal:      General: Bowel sounds are normal.      Palpations: Abdomen is soft.      Tenderness: There is no abdominal tenderness.   Musculoskeletal:         General: Normal range  of motion.      Cervical back: Normal range of motion and neck supple.      Right lower leg: Edema present.      Left lower leg: Edema present.      Comments: 2+ pitting edema to bilateral lower extremities   Lymphadenopathy:      Cervical: No cervical adenopathy.   Skin:     General: Skin is warm and dry.      Capillary Refill: Capillary refill takes less than 2 seconds.   Neurological:      General: No focal deficit present.      Mental Status: He is alert and oriented to person, place, and time.   Psychiatric:         Mood and Affect: Mood normal.         Procedures       Results for orders placed or performed during the hospital encounter of 01/29/25   ECG 12 Lead ED Triage Standing Order; SOA    Collection Time: 01/29/25 12:48 PM   Result Value Ref Range    QT Interval 368 ms    QTC Interval 429 ms   COVID-19 and FLU A/B PCR, 1 HR TAT - Swab, Nasopharynx    Collection Time: 01/29/25  1:18 PM    Specimen: Nasopharynx; Swab   Result Value Ref Range    COVID19 Not Detected Not Detected - Ref. Range    Influenza A PCR Not Detected Not Detected    Influenza B PCR Not Detected Not Detected   Rapid Strep A Screen - Swab, Throat    Collection Time: 01/29/25  1:18 PM    Specimen: Throat; Swab   Result Value Ref Range    Strep A Ag Negative Negative   Comprehensive Metabolic Panel    Collection Time: 01/29/25  1:18 PM    Specimen: Blood   Result Value Ref Range    Glucose 86 65 - 99 mg/dL    BUN 16 8 - 23 mg/dL    Creatinine 0.78 0.76 - 1.27 mg/dL    Sodium 141 136 - 145 mmol/L    Potassium 4.4 3.5 - 5.2 mmol/L    Chloride 108 (H) 98 - 107 mmol/L    CO2 24.1 22.0 - 29.0 mmol/L    Calcium 8.8 8.6 - 10.5 mg/dL    Total Protein 6.7 6.0 - 8.5 g/dL    Albumin 3.7 3.5 - 5.2 g/dL    ALT (SGPT) 12 1 - 41 U/L    AST (SGOT) 19 1 - 40 U/L    Alkaline Phosphatase 61 39 - 117 U/L    Total Bilirubin 0.4 0.0 - 1.2 mg/dL    Globulin 3.0 gm/dL    A/G Ratio 1.2 g/dL    BUN/Creatinine Ratio 20.5 7.0 - 25.0    Anion Gap 8.9 5.0 - 15.0  mmol/L    eGFR 99.0 >60.0 mL/min/1.73   BNP    Collection Time: 01/29/25  1:18 PM    Specimen: Blood   Result Value Ref Range    proBNP 1,805.0 (H) 0.0 - 900.0 pg/mL   High Sensitivity Troponin T    Collection Time: 01/29/25  1:18 PM    Specimen: Blood   Result Value Ref Range    HS Troponin T 20 <22 ng/L   CBC Auto Differential    Collection Time: 01/29/25  1:18 PM    Specimen: Blood   Result Value Ref Range    WBC 17.67 (H) 3.40 - 10.80 10*3/mm3    RBC 3.37 (L) 4.14 - 5.80 10*6/mm3    Hemoglobin 9.9 (L) 13.0 - 17.7 g/dL    Hematocrit 32.3 (L) 37.5 - 51.0 %    MCV 95.8 79.0 - 97.0 fL    MCH 29.4 26.6 - 33.0 pg    MCHC 30.7 (L) 31.5 - 35.7 g/dL    RDW 16.8 (H) 12.3 - 15.4 %    RDW-SD 58.8 (H) 37.0 - 54.0 fl    MPV 10.9 6.0 - 12.0 fL    Platelets 287 140 - 450 10*3/mm3    Neutrophil % 78.1 (H) 42.7 - 76.0 %    Lymphocyte % 12.6 (L) 19.6 - 45.3 %    Monocyte % 6.5 5.0 - 12.0 %    Eosinophil % 1.9 0.3 - 6.2 %    Basophil % 0.4 0.0 - 1.5 %    Immature Grans % 0.5 0.0 - 0.5 %    Neutrophils, Absolute 13.82 (H) 1.70 - 7.00 10*3/mm3    Lymphocytes, Absolute 2.23 0.70 - 3.10 10*3/mm3    Monocytes, Absolute 1.14 (H) 0.10 - 0.90 10*3/mm3    Eosinophils, Absolute 0.33 0.00 - 0.40 10*3/mm3    Basophils, Absolute 0.07 0.00 - 0.20 10*3/mm3    Immature Grans, Absolute 0.08 (H) 0.00 - 0.05 10*3/mm3    nRBC 0.0 0.0 - 0.2 /100 WBC   Protime-INR    Collection Time: 01/29/25  1:18 PM    Specimen: Blood   Result Value Ref Range    Protime 12.8 11.6 - 15.1 Seconds    INR 0.95 0.90 - 1.10   aPTT    Collection Time: 01/29/25  1:18 PM    Specimen: Blood   Result Value Ref Range    PTT 31.9 24.5 - 35.9 seconds   Magnesium    Collection Time: 01/29/25  1:18 PM    Specimen: Blood   Result Value Ref Range    Magnesium 1.8 1.6 - 2.4 mg/dL   TSH    Collection Time: 01/29/25  1:18 PM    Specimen: Blood   Result Value Ref Range    TSH 0.627 0.270 - 4.200 uIU/mL   T4, Free    Collection Time: 01/29/25  1:18 PM    Specimen: Blood   Result Value Ref  Range    Free T4 1.01 0.92 - 1.68 ng/dL   Procalcitonin    Collection Time: 01/29/25  1:18 PM    Specimen: Blood   Result Value Ref Range    Procalcitonin 0.07 0.00 - 0.25 ng/mL   C-reactive Protein    Collection Time: 01/29/25  1:18 PM    Specimen: Blood   Result Value Ref Range    C-Reactive Protein 4.05 (H) 0.00 - 0.50 mg/dL   Sedimentation Rate    Collection Time: 01/29/25  1:18 PM    Specimen: Blood   Result Value Ref Range    Sed Rate 28 (H) 0 - 20 mm/hr   Green Top (Gel)    Collection Time: 01/29/25  1:18 PM   Result Value Ref Range    Extra Tube Hold for add-ons.    Lavender Top    Collection Time: 01/29/25  1:18 PM   Result Value Ref Range    Extra Tube hold for add-on    Gold Top - SST    Collection Time: 01/29/25  1:18 PM   Result Value Ref Range    Extra Tube Hold for add-ons.    Light Blue Top    Collection Time: 01/29/25  1:18 PM   Result Value Ref Range    Extra Tube Hold for add-ons.    Blood Gas, Arterial With Co-Ox    Collection Time: 01/29/25  1:20 PM    Specimen: Arterial Blood   Result Value Ref Range    Site Right Radial     Toño's Test Positive     pH, Arterial 7.370 7.350 - 7.450 pH units    pCO2, Arterial 47.3 (H) 35.0 - 45.0 mm Hg    pO2, Arterial 52.6 (C) 83.0 - 108.0 mm Hg    HCO3, Arterial 27.3 (H) 20.0 - 26.0 mmol/L    Base Excess, Arterial 1.6 0.0 - 2.0 mmol/L    O2 Saturation, Arterial 89.9 (L) 94.0 - 99.0 %    Hemoglobin, Blood Gas 9.8 (L) 14 - 18 g/dL    Hematocrit, Blood Gas 30.0 (L) 38.0 - 51.0 %    Oxyhemoglobin 83.2 (L) 94 - 99 %    Methemoglobin 0.60 0.00 - 3.00 %    Carboxyhemoglobin 6.9 (H) 0 - 5 %    A-a DO2 36.6 0.0 - 300.0 mmHg    CO2 Content 28.8 22 - 33 mmol/L    Barometric Pressure for Blood Gas 726 mmHg    Modality Room Air     FIO2 21 %    Ventilator Mode NA     Notified Who GERARD Silva     Notified By [id number]     Notified Time 01/29/2025 13:24     Collected by [id number]     pH, Temp Corrected      pCO2, Temperature Corrected      pO2, Temperature Corrected     Lactic Acid,  Plasma    Collection Time: 01/29/25  2:12 PM    Specimen: Blood   Result Value Ref Range    Lactate 0.6 0.5 - 2.0 mmol/L   High Sensitivity Troponin T 1Hr    Collection Time: 01/29/25  2:12 PM    Specimen: Blood   Result Value Ref Range    HS Troponin T 22 (H) <22 ng/L    Troponin T Numeric Delta 2 Abnormal if >/=3 ng/L   Hepatitis Panel, Acute    Collection Time: 01/29/25  2:12 PM    Specimen: Blood   Result Value Ref Range    Hepatitis B Surface Ag Non-Reactive Non-Reactive    Hep A IgM Non-Reactive Non-Reactive    Hep B C IgM Non-Reactive Non-Reactive    Hepatitis C Ab Non-Reactive Non-Reactive   HIV-1 / O / 2 Ag / Antibody    Collection Time: 01/29/25  2:12 PM    Specimen: Blood   Result Value Ref Range    HIV-1/ HIV-2 Non-Reactive Non-Reactive   Urinalysis With Microscopic If Indicated (No Culture) - Urine, Clean Catch    Collection Time: 01/29/25  3:54 PM    Specimen: Urine, Clean Catch   Result Value Ref Range    Color, UA Yellow Yellow, Straw    Appearance, UA Clear Clear    pH, UA 6.0 5.0 - 8.0    Specific Gravity, UA 1.010 1.005 - 1.030    Glucose, UA Negative Negative    Ketones, UA Negative Negative    Bilirubin, UA Negative Negative    Blood, UA Negative Negative    Protein, UA Negative Negative    Leuk Esterase, UA Negative Negative    Nitrite, UA Negative Negative    Urobilinogen, UA 0.2 E.U./dL 0.2 - 1.0 E.U./dL          ED Course  ED Course as of 01/29/25 1706   Wed Jan 29, 2025   1325 pO2, Arterial(!!): 52.6  Patient to be started on 2L of oxygen [AH]   1356 XR Chest AP  FINDINGS: The heart is normal in size. The mediastinum is unremarkable.  The lungs are clear. There is no pneumothorax. There are no acute  osseous abnormalities.     IMPRESSION:  No acute cardiopulmonary process.   [AH]   1519 ECG demonstrates normal sinus rhythm at 82 bpm.  [place] and QTc interval and normalizes QRS duration.  There are no acute ST-T wave changes. [RA]   1520 US Venous Doppler Lower Extremity Bilateral  (duplex)  FINDINGS: Normal phasic flow was noted in the visualized deep venous  system. No intraluminal increased echogenicity is noted to suggest  thrombus. There is normal compression and augmentation of the venous  structures.  No abnormal venous collaterals are seen.     IMPRESSION:  No evidence of deep venous thrombosis.   []   1646 Dr. Rojas to admit []      ED Course User Index  [AH] Arielle Silva, PA  [RA] Gil Tafoya MD                                                       Medical Decision Making  64-year-old male who presents to the ED today for shortness of breath and peripheral edema.  Patient was hypoxic on room air.  He was started on 2 L of oxygen and tolerated that well.  He was given 80 mg of Lasix IV and has been diuresing well.  He states he has been to the bathroom about 5 times with significant urine output each time.  Patient's EKG showed no acute ischemia.  His troponins were stable.  Chest x-ray and CT of the chest were unremarkable.  The patient also appears to have thrush in his mouth.  This also started yesterday.  He does use a steroid inhaler.  He tested negative for HIV and hepatitis today.  I discussed the patient with Dr. Rojas with our hospitalist service team and he will be admitted.    Problems Addressed:  Acute congestive heart failure, unspecified heart failure type: complicated acute illness or injury  Acute respiratory failure with hypoxia: complicated acute illness or injury  Thrush: complicated acute illness or injury    Amount and/or Complexity of Data Reviewed  Labs: ordered. Decision-making details documented in ED Course.  Radiology: ordered. Decision-making details documented in ED Course.  ECG/medicine tests: ordered.    Risk  Prescription drug management.  Decision regarding hospitalization.        Final diagnoses:   Acute congestive heart failure, unspecified heart failure type   Thrush   Acute respiratory failure with hypoxia       ED Disposition  ED  Disposition       ED Disposition   Decision to Admit    Condition   --    Comment   Level of Care: Telemetry [5]   Diagnosis: CHF (congestive heart failure) [094204]   Certification: I Certify That Inpatient Hospital Services Are Medically Necessary For Greater Than 2 Midnights                 No follow-up provider specified.       Medication List      No changes were made to your prescriptions during this visit.            Arielle Silva PA  01/29/25 3916     - holding home Norvasc of 5 mg daily i/s/o hypotension   - c/w lasix 80IV BID    #Dress Syndrome  - Currently taking Prednisone 60 mg with improvement in rash   - Continue Prednisone 60 mg daily (DRESS requires taper over 8-12 weeks, will not decrease yet as still in renal failure likely 2/2 dress)  - will necessitate a prolonged taper

## 2025-01-29 NOTE — H&P
[facility]  Date: 2025   Patient Name: Nj Urias  : 1960  MRN: [id number]  Primary Care Physician:  Reynaldo Rodriguez MD  Date of admission: 2025    Subjective   Subjective     Chief Complaint: SOB    HPI:    Nj Urias is a 64 y.o. male past medical history of COPD, hypertension that presents to the emergency department for evaluation of shortness of breath since yesterday evening.  He also endorses bilateral lower extremity swelling and sore throat.  In the emergency department found to have new onset congestive heart failure and started on IV Lasix.  Patient denies any fevers, chills, sweats, nausea, vomiting, chest pain, palpitations, abdominal pain diarrhea constipation, dysuria, weakness, rash.  He also endorse sore throat and was found to have thrush.  Will be started on Lasix and nystatin and admitted for ongoing monitoring and management.      Personal History     Past Medical History:  Past Medical History:   Diagnosis Date    COPD (chronic obstructive pulmonary disease)     Hypertension          Past Surgical History:  No past surgical history on file.      Family History:   No family history on file.      Social History:   Social History     Tobacco Use    Smoking status: Every Day     Current packs/day: 2.00     Types: Cigarettes   Substance Use Topics    Alcohol use: No    Drug use: No         Home Medications:  HYDROcodone-acetaminophen and lisinopril    Allergies:  Allergies   Allergen Reactions    Codeine GI Intolerance    Demerol [Meperidine] Anxiety       Review of Systems   All systems were reviewed and negative except for: SOB, sore throat, BL LE swelling    Objective   Objective     Vitals:   Temp:  [98.3 °F (36.8 °C)-98.4 °F (36.9 °C)] 98.4 °F (36.9 °C)  Heart Rate:  [71-90] 71  Resp:  [17-20] 17  BP: (119-159)/(61-98) 121/98  Flow (L/min) (Oxygen Therapy):  [2] 2    Physical Exam    Constitutional: Awake, alert, no acute  distress   Eyes: Pupils equal, sclerae anicteric, no conjunctival injection   HENT: NCAT, mucous membranes moist   Neck: Supple, no thyromegaly, no lymphadenopathy, trachea midline   Respiratory: Clear to auscultation bilaterally, nonlabored respirations    Cardiovascular: RRR, no murmurs, rubs, or gallops, palpable pedal pulses bilaterally   Gastrointestinal: Positive bowel sounds, soft, nontender, nondistended   Musculoskeletal: +2 bilateral ankle edema, no clubbing or cyanosis to extremities   Psychiatric: Appropriate affect, cooperative   Neurologic: Oriented x 3, strength symmetric in all extremities, Cranial Nerves grossly intact to confrontation, speech clear   Skin: No rashes     Result Review    Result Review:  I have personally reviewed the results from the time of this admission to 1/29/2025 16:57 EST and agree with these findings:  [x]  Laboratory  []  Microbiology  [x]  Radiology  []  EKG/Telemetry   []  Cardiology/Vascular   []  Pathology  []  Old records  []  Other:      Assessment & Plan   Assessment / Plan     Assessment/Plan:   Congestive heart failure, unknown ejection fraction: Will continue diurese with IV Lasix twice daily.  Check echocardiogram.  Submental oxygen as needed.  Will monitor on telemetry.  Monitor renal function and electrolytes while on IV diuresis.  Thrush: Nystatin swish and swallow  Hypertension: Add back home medications, monitor and titrate as needed.  Hx COPD without exacerbation: Continue home regimen      VTE Prophylaxis:  Pharmacologic VTE prophylaxis orders are signed & held.          CODE STATUS:    Code Status (Patient has no pulse and is not breathing): CPR (Attempt to Resuscitate)  Medical Interventions (Patient has pulse or is breathing): Full Support      Admission Status:  I believe this patient meets inpatient status.    Electronically signed by Alejandro Rojas Jr, MD, 01/29/25, 4:57 PM EST.

## 2025-01-29 NOTE — CASE MANAGEMENT/SOCIAL WORK
Discharge Planning Assessment   Leeroy     Patient Name: Nj Urias  MRN: [id number]  Today's Date: 1/29/2025    Admit Date: 1/29/2025    Plan: Spoke with patient and S/O at bedside. Patient lives alone and will return home at discharge. Patient has no POA or Living Will. Patient has no DME or HH. Patients PCP Reynaldo Rodriguez [place], [place] and uses [facility] pharmacy. Patients family will transport home at discharge.   Discharge Needs Assessment       Row Name 01/29/25 165       Living Environment    People in Home alone    Current Living Arrangements home    Potentially Unsafe Housing Conditions none    In the past 12 months has the electric, gas, oil, or water company threatened to shut off services in your home? No    Primary Care Provided by self    Provides Primary Care For no one    Family Caregiver if Needed significant other;grandchild(uzma), adult    Family Caregiver Names Sunni Delvalle S/O [phone] or Ilia Urias grandson [phone]    Quality of Family Relationships helpful;involved;supportive    Able to Return to Prior Arrangements yes       Resource/Environmental Concerns    Resource/Environmental Concerns none    Transportation Concerns none       Transportation Needs    In the past 12 months, has lack of transportation kept you from medical appointments or from getting medications? no    In the past 12 months, has lack of transportation kept you from meetings, work, or from getting things needed for daily living? No       Food Insecurity    Within the past 12 months, you worried that your food would run out before you got the money to buy more. Never true    Within the past 12 months, the food you bought just didn't last and you didn't have money to get more. Never true       Transition Planning    Patient/Family Anticipates Transition to home with family    Patient/Family Anticipated Services at Transition none    Transportation Anticipated family or friend will provide        Discharge Needs Assessment    Readmission Within the Last 30 Days no previous admission in last 30 days    Equipment Currently Used at Home none    Concerns to be Addressed discharge planning    Do you want help finding or keeping work or a job? I do not need or want help    Do you want help with school or training? For example, starting or completing job training or getting a high school diploma, GED or equivalent No    Anticipated Changes Related to Illness none    Equipment Needed After Discharge none                   Discharge Plan       Row Name 01/29/25 0042       Plan    Plan Spoke with patient and S/O at bedside. Patient lives alone and will return home at discharge. Patient has no POA or Living Will. Patient has no DME or HH. Patients PCP JHONNY Tracey and uses [facility] pharmacy. Patients family will transport home at discharge.    Patient/Family in Agreement with Plan yes                  Continued Care and Services - Admitted Since 1/29/2025    No active coordination exists for this encounter.       Expected Discharge Date and Time       Expected Discharge Date Expected Discharge Time    Feb 1, 2025            Demographic Summary       Row Name 01/29/25 5705       General Information    Admission Type inpatient    Arrived From home    Referral Source emergency department    Reason for Consult discharge planning    Preferred Language English                 Prerna Umana

## 2025-01-29 NOTE — NURSING NOTE
Patient arrived to unit via wheel chair with transport team. On 2LNC and in no distress. States that he is not going to stay because he does not want  SO to go home alone. Lead made aware, moved to room 325 without incident.

## 2025-01-30 ENCOUNTER — APPOINTMENT (OUTPATIENT)
Dept: CARDIOLOGY | Facility: HOSPITAL | Age: 65
End: 2025-01-30
Payer: MEDICARE

## 2025-01-30 PROBLEM — I50.9 CHF (CONGESTIVE HEART FAILURE): Status: RESOLVED | Noted: 2025-01-29 | Resolved: 2025-01-30

## 2025-01-30 LAB
ABSOLUTE LUNG FLUID CONTENT: 22 % (ref 20–35)
ALBUMIN SERPL-MCNC: 3.5 G/DL (ref 3.5–5.2)
ALBUMIN/GLOB SERPL: 1.2 G/DL
ALP SERPL-CCNC: 56 U/L (ref 39–117)
ALT SERPL W P-5'-P-CCNC: 10 U/L (ref 1–41)
ANION GAP SERPL CALCULATED.3IONS-SCNC: 9 MMOL/L (ref 5–15)
AST SERPL-CCNC: 15 U/L (ref 1–40)
AV MEAN PRESS GRAD SYS DOP V1V2: 5 MMHG
AV VMAX SYS DOP: 145 CM/SEC
BASOPHILS # BLD AUTO: 0.06 10*3/MM3 (ref 0–0.2)
BASOPHILS NFR BLD AUTO: 0.5 % (ref 0–1.5)
BH CV ECHO MEAS - ACS: 2.1 CM
BH CV ECHO MEAS - AO MAX PG: 8.4 MMHG
BH CV ECHO MEAS - AO ROOT DIAM: 3.5 CM
BH CV ECHO MEAS - AO V2 VTI: 26.5 CM
BH CV ECHO MEAS - EDV(CUBED): 91.1 ML
BH CV ECHO MEAS - EDV(MOD-SP2): 108 ML
BH CV ECHO MEAS - EDV(MOD-SP4): 92.1 ML
BH CV ECHO MEAS - EF(MOD-SP2): 61.6 %
BH CV ECHO MEAS - EF(MOD-SP4): 73.6 %
BH CV ECHO MEAS - ESV(CUBED): 41.1 ML
BH CV ECHO MEAS - ESV(MOD-SP2): 41.5 ML
BH CV ECHO MEAS - ESV(MOD-SP4): 24.3 ML
BH CV ECHO MEAS - FS: 23.3 %
BH CV ECHO MEAS - IVS/LVPW: 0.96 CM
BH CV ECHO MEAS - IVSD: 1.1 CM
BH CV ECHO MEAS - LA DIMENSION: 3.8 CM
BH CV ECHO MEAS - LAT PEAK E' VEL: 9 CM/SEC
BH CV ECHO MEAS - LV DIASTOLIC VOL/BSA (35-75): 44.2 CM2
BH CV ECHO MEAS - LV MASS(C)D: 180.7 GRAMS
BH CV ECHO MEAS - LV SYSTOLIC VOL/BSA (12-30): 11.7 CM2
BH CV ECHO MEAS - LVIDD: 4.5 CM
BH CV ECHO MEAS - LVIDS: 3.5 CM
BH CV ECHO MEAS - LVOT AREA: 4.5 CM2
BH CV ECHO MEAS - LVOT DIAM: 2.4 CM
BH CV ECHO MEAS - LVPWD: 1.15 CM
BH CV ECHO MEAS - MED PEAK E' VEL: 6.4 CM/SEC
BH CV ECHO MEAS - MV A MAX VEL: 68.6 CM/SEC
BH CV ECHO MEAS - MV E MAX VEL: 57.4 CM/SEC
BH CV ECHO MEAS - MV E/A: 0.84
BH CV ECHO MEAS - PA ACC TIME: 0.11 SEC
BH CV ECHO MEAS - RAP SYSTOLE: 10 MMHG
BH CV ECHO MEAS - RVSP: 43.2 MMHG
BH CV ECHO MEAS - SV(MOD-SP2): 66.5 ML
BH CV ECHO MEAS - SV(MOD-SP4): 67.8 ML
BH CV ECHO MEAS - SVI(MOD-SP2): 31.9 ML/M2
BH CV ECHO MEAS - SVI(MOD-SP4): 32.6 ML/M2
BH CV ECHO MEAS - TAPSE (>1.6): 2.9 CM
BH CV ECHO MEAS - TR MAX PG: 33.2 MMHG
BH CV ECHO MEAS - TR MAX VEL: 288 CM/SEC
BH CV ECHO MEASUREMENTS AVERAGE E/E' RATIO: 7.45
BILIRUB SERPL-MCNC: 0.5 MG/DL (ref 0–1.2)
BUN SERPL-MCNC: 15 MG/DL (ref 8–23)
BUN/CREAT SERPL: 18.1 (ref 7–25)
CALCIUM SPEC-SCNC: 8.9 MG/DL (ref 8.6–10.5)
CHLORIDE SERPL-SCNC: 104 MMOL/L (ref 98–107)
CO2 SERPL-SCNC: 30 MMOL/L (ref 22–29)
CREAT SERPL-MCNC: 0.83 MG/DL (ref 0.76–1.27)
DEPRECATED RDW RBC AUTO: 58 FL (ref 37–54)
EGFRCR SERPLBLD CKD-EPI 2021: 97.1 ML/MIN/1.73
EOSINOPHIL # BLD AUTO: 0.29 10*3/MM3 (ref 0–0.4)
EOSINOPHIL NFR BLD AUTO: 2.3 % (ref 0.3–6.2)
ERYTHROCYTE [DISTWIDTH] IN BLOOD BY AUTOMATED COUNT: 16.7 % (ref 12.3–15.4)
GLOBULIN UR ELPH-MCNC: 3 GM/DL
GLUCOSE SERPL-MCNC: 93 MG/DL (ref 65–99)
HCT VFR BLD AUTO: 32.3 % (ref 37.5–51)
HGB BLD-MCNC: 9.9 G/DL (ref 13–17.7)
IMM GRANULOCYTES # BLD AUTO: 0.06 10*3/MM3 (ref 0–0.05)
IMM GRANULOCYTES NFR BLD AUTO: 0.5 % (ref 0–0.5)
LEFT ATRIUM VOLUME INDEX: 19.8 ML/M2
LV EF BIPLANE MOD: 68.5 %
LYMPHOCYTES # BLD AUTO: 2.34 10*3/MM3 (ref 0.7–3.1)
LYMPHOCYTES NFR BLD AUTO: 18.5 % (ref 19.6–45.3)
MCH RBC QN AUTO: 29 PG (ref 26.6–33)
MCHC RBC AUTO-ENTMCNC: 30.7 G/DL (ref 31.5–35.7)
MCV RBC AUTO: 94.7 FL (ref 79–97)
MONOCYTES # BLD AUTO: 1.01 10*3/MM3 (ref 0.1–0.9)
MONOCYTES NFR BLD AUTO: 8 % (ref 5–12)
NEUTROPHILS NFR BLD AUTO: 70.2 % (ref 42.7–76)
NEUTROPHILS NFR BLD AUTO: 8.86 10*3/MM3 (ref 1.7–7)
NRBC BLD AUTO-RTO: 0 /100 WBC (ref 0–0.2)
PLATELET # BLD AUTO: 294 10*3/MM3 (ref 140–450)
PMV BLD AUTO: 11.3 FL (ref 6–12)
POTASSIUM SERPL-SCNC: 3.8 MMOL/L (ref 3.5–5.2)
POTASSIUM SERPL-SCNC: 4 MMOL/L (ref 3.5–5.2)
PROT SERPL-MCNC: 6.5 G/DL (ref 6–8.5)
QT INTERVAL: 368 MS
QTC INTERVAL: 429 MS
RBC # BLD AUTO: 3.41 10*6/MM3 (ref 4.14–5.8)
SODIUM SERPL-SCNC: 143 MMOL/L (ref 136–145)
WBC NRBC COR # BLD AUTO: 12.62 10*3/MM3 (ref 3.4–10.8)

## 2025-01-30 PROCEDURE — 94640 AIRWAY INHALATION TREATMENT: CPT

## 2025-01-30 PROCEDURE — 94799 UNLISTED PULMONARY SVC/PX: CPT

## 2025-01-30 PROCEDURE — 25010000002 FUROSEMIDE PER 20 MG: Performed by: INTERNAL MEDICINE

## 2025-01-30 PROCEDURE — 84132 ASSAY OF SERUM POTASSIUM: CPT

## 2025-01-30 PROCEDURE — 94761 N-INVAS EAR/PLS OXIMETRY MLT: CPT

## 2025-01-30 PROCEDURE — 94726 PLETHYSMOGRAPHY LUNG VOLUMES: CPT

## 2025-01-30 PROCEDURE — 85025 COMPLETE CBC W/AUTO DIFF WBC: CPT | Performed by: INTERNAL MEDICINE

## 2025-01-30 PROCEDURE — 92610 EVALUATE SWALLOWING FUNCTION: CPT

## 2025-01-30 PROCEDURE — 97165 OT EVAL LOW COMPLEX 30 MIN: CPT

## 2025-01-30 PROCEDURE — 25010000002 HEPARIN (PORCINE) PER 1000 UNITS: Performed by: INTERNAL MEDICINE

## 2025-01-30 PROCEDURE — 94664 DEMO&/EVAL PT USE INHALER: CPT

## 2025-01-30 PROCEDURE — 99232 SBSQ HOSP IP/OBS MODERATE 35: CPT | Performed by: INTERNAL MEDICINE

## 2025-01-30 PROCEDURE — 80053 COMPREHEN METABOLIC PANEL: CPT | Performed by: INTERNAL MEDICINE

## 2025-01-30 PROCEDURE — 93306 TTE W/DOPPLER COMPLETE: CPT | Performed by: INTERNAL MEDICINE

## 2025-01-30 PROCEDURE — 93306 TTE W/DOPPLER COMPLETE: CPT

## 2025-01-30 PROCEDURE — 97161 PT EVAL LOW COMPLEX 20 MIN: CPT

## 2025-01-30 RX ORDER — FUROSEMIDE 20 MG/1
20 TABLET ORAL DAILY
Qty: 7 TABLET | Refills: 0 | Status: SHIPPED | OUTPATIENT
Start: 2025-01-30 | End: 2025-02-01

## 2025-01-30 RX ORDER — POTASSIUM CHLORIDE 1500 MG/1
20 TABLET, EXTENDED RELEASE ORAL ONCE
Status: COMPLETED | OUTPATIENT
Start: 2025-01-30 | End: 2025-01-30

## 2025-01-30 RX ORDER — GABAPENTIN 300 MG/1
600 CAPSULE ORAL EVERY 8 HOURS SCHEDULED
Status: DISCONTINUED | OUTPATIENT
Start: 2025-01-30 | End: 2025-02-01

## 2025-01-30 RX ORDER — CYANOCOBALAMIN 1000 UG/ML
1000 INJECTION, SOLUTION INTRAMUSCULAR; SUBCUTANEOUS
Status: DISCONTINUED | OUTPATIENT
Start: 2025-02-05 | End: 2025-02-01 | Stop reason: HOSPADM

## 2025-01-30 RX ORDER — BUDESONIDE AND FORMOTEROL FUMARATE DIHYDRATE 160; 4.5 UG/1; UG/1
2 AEROSOL RESPIRATORY (INHALATION)
Status: DISCONTINUED | OUTPATIENT
Start: 2025-01-30 | End: 2025-02-01 | Stop reason: HOSPADM

## 2025-01-30 RX ORDER — HYDROCODONE BITARTRATE AND ACETAMINOPHEN 10; 325 MG/1; MG/1
1 TABLET ORAL EVERY 6 HOURS PRN
Status: DISCONTINUED | OUTPATIENT
Start: 2025-01-30 | End: 2025-02-01 | Stop reason: HOSPADM

## 2025-01-30 RX ORDER — FUROSEMIDE 10 MG/ML
40 INJECTION INTRAMUSCULAR; INTRAVENOUS 2 TIMES DAILY
Status: DISCONTINUED | OUTPATIENT
Start: 2025-01-30 | End: 2025-01-31

## 2025-01-30 RX ORDER — ROSUVASTATIN CALCIUM 20 MG/1
20 TABLET, COATED ORAL NIGHTLY
Status: DISCONTINUED | OUTPATIENT
Start: 2025-01-30 | End: 2025-02-01 | Stop reason: HOSPADM

## 2025-01-30 RX ORDER — FERROUS SULFATE 325(65) MG
325 TABLET ORAL EVERY OTHER DAY
Status: DISCONTINUED | OUTPATIENT
Start: 2025-01-30 | End: 2025-02-01 | Stop reason: HOSPADM

## 2025-01-30 RX ORDER — FAMOTIDINE 20 MG/1
40 TABLET, FILM COATED ORAL DAILY
Status: DISCONTINUED | OUTPATIENT
Start: 2025-01-30 | End: 2025-02-01 | Stop reason: HOSPADM

## 2025-01-30 RX ORDER — MIRTAZAPINE 15 MG/1
45 TABLET, FILM COATED ORAL DAILY
Status: DISCONTINUED | OUTPATIENT
Start: 2025-01-30 | End: 2025-02-01 | Stop reason: HOSPADM

## 2025-01-30 RX ORDER — CLONAZEPAM 0.5 MG/1
0.25 TABLET ORAL 2 TIMES DAILY PRN
Status: DISCONTINUED | OUTPATIENT
Start: 2025-01-30 | End: 2025-02-01 | Stop reason: HOSPADM

## 2025-01-30 RX ORDER — TAMSULOSIN HYDROCHLORIDE 0.4 MG/1
0.4 CAPSULE ORAL DAILY
Status: DISCONTINUED | OUTPATIENT
Start: 2025-01-30 | End: 2025-02-01 | Stop reason: HOSPADM

## 2025-01-30 RX ORDER — ALBUTEROL SULFATE 0.83 MG/ML
2.5 SOLUTION RESPIRATORY (INHALATION) EVERY 6 HOURS PRN
Status: DISCONTINUED | OUTPATIENT
Start: 2025-01-30 | End: 2025-02-01 | Stop reason: HOSPADM

## 2025-01-30 RX ADMIN — HEPARIN SODIUM 5000 UNITS: 5000 INJECTION INTRAVENOUS; SUBCUTANEOUS at 05:46

## 2025-01-30 RX ADMIN — MIRTAZAPINE 45 MG: 15 TABLET, FILM COATED ORAL at 09:05

## 2025-01-30 RX ADMIN — TAMSULOSIN HYDROCHLORIDE 0.4 MG: 0.4 CAPSULE ORAL at 09:05

## 2025-01-30 RX ADMIN — IPRATROPIUM BROMIDE AND ALBUTEROL SULFATE 3 ML: 2.5; .5 SOLUTION RESPIRATORY (INHALATION) at 13:09

## 2025-01-30 RX ADMIN — FAMOTIDINE 40 MG: 20 TABLET, FILM COATED ORAL at 09:05

## 2025-01-30 RX ADMIN — IPRATROPIUM BROMIDE AND ALBUTEROL SULFATE 3 ML: 2.5; .5 SOLUTION RESPIRATORY (INHALATION) at 00:12

## 2025-01-30 RX ADMIN — HYDROCODONE BITARTRATE AND ACETAMINOPHEN 1 TABLET: 5; 325 TABLET ORAL at 09:05

## 2025-01-30 RX ADMIN — ROSUVASTATIN 20 MG: 20 TABLET, FILM COATED ORAL at 21:25

## 2025-01-30 RX ADMIN — HYDROCODONE BITARTRATE AND ACETAMINOPHEN 1 TABLET: 10; 325 TABLET ORAL at 21:25

## 2025-01-30 RX ADMIN — NYSTATIN 500000 UNITS: 100000 SUSPENSION ORAL at 11:25

## 2025-01-30 RX ADMIN — BUDESONIDE AND FORMOTEROL FUMARATE DIHYDRATE 2 PUFF: 160; 4.5 AEROSOL RESPIRATORY (INHALATION) at 13:09

## 2025-01-30 RX ADMIN — CLONAZEPAM 0.25 MG: 0.5 TABLET ORAL at 09:05

## 2025-01-30 RX ADMIN — GABAPENTIN 600 MG: 300 CAPSULE ORAL at 13:18

## 2025-01-30 RX ADMIN — HEPARIN SODIUM 5000 UNITS: 5000 INJECTION INTRAVENOUS; SUBCUTANEOUS at 13:18

## 2025-01-30 RX ADMIN — IPRATROPIUM BROMIDE AND ALBUTEROL SULFATE 3 ML: 2.5; .5 SOLUTION RESPIRATORY (INHALATION) at 06:37

## 2025-01-30 RX ADMIN — NYSTATIN 500000 UNITS: 100000 SUSPENSION ORAL at 09:33

## 2025-01-30 RX ADMIN — LISINOPRIL 10 MG: 10 TABLET ORAL at 08:52

## 2025-01-30 RX ADMIN — NYSTATIN 500000 UNITS: 100000 SUSPENSION ORAL at 17:26

## 2025-01-30 RX ADMIN — NYSTATIN 500000 UNITS: 100000 SUSPENSION ORAL at 21:25

## 2025-01-30 RX ADMIN — Medication 10 ML: at 09:33

## 2025-01-30 RX ADMIN — CLONAZEPAM 0.25 MG: 0.5 TABLET ORAL at 21:25

## 2025-01-30 RX ADMIN — IPRATROPIUM BROMIDE AND ALBUTEROL SULFATE 3 ML: 2.5; .5 SOLUTION RESPIRATORY (INHALATION) at 18:37

## 2025-01-30 RX ADMIN — POTASSIUM CHLORIDE 20 MEQ: 1500 TABLET, EXTENDED RELEASE ORAL at 05:46

## 2025-01-30 RX ADMIN — FUROSEMIDE 40 MG: 10 INJECTION, SOLUTION INTRAMUSCULAR; INTRAVENOUS at 08:52

## 2025-01-30 RX ADMIN — FERROUS SULFATE TAB 325 MG (65 MG ELEMENTAL FE) 325 MG: 325 (65 FE) TAB at 09:05

## 2025-01-30 RX ADMIN — HEPARIN SODIUM 5000 UNITS: 5000 INJECTION INTRAVENOUS; SUBCUTANEOUS at 21:25

## 2025-01-30 RX ADMIN — Medication 10 ML: at 21:26

## 2025-01-30 NOTE — THERAPY EVALUATION
"Acute Care - Speech Language Pathology   Swallow Initial Evaluation [facility]  CLINICAL DYSPHAGIA ASSESSMENT     Patient Name: Nj Urias  : 1960  MRN: [id number]  Today's Date: 2025             Admit Date: 2025    Visit Dx:     ICD-10-CM ICD-9-CM   1. Acute congestive heart failure, unspecified heart failure type  I50.9 428.0   2. Thrush  B37.0 112.0   3. Acute respiratory failure with hypoxia  J96.01 518.81     Patient Active Problem List   Diagnosis    CHF (congestive heart failure)     Past Medical History:   Diagnosis Date    COPD (chronic obstructive pulmonary disease)     Hypertension      History reviewed. No pertinent surgical history.    Nj Urias was seen at bedside this pm on 3S to assess safety/efficacy of swallowing fnx, determine safest/least restrictive diet tolerance. A visitor was present.     Per chart review, \"past medical history of COPD, hypertension that presents to the emergency department for evaluation of shortness of breath since yesterday evening. He also endorses bilateral lower extremity swelling and sore throat. In the emergency department found to have new onset congestive heart failure and started on IV Lasix. Patient denies any fevers, chills, sweats, nausea, vomiting, chest pain, palpitations, abdominal pain diarrhea constipation, dysuria, weakness, rash. He also endorse sore throat and was found to have thrush. Will be started on Lasix and nystatin and admitted for ongoing monitoring and management.\"    He was referred to r/o dysphagia. Per review of EMR, no prior SLP encounters noted within this system. He denied any overt s/s aspiration or dysphagia w/ po intake.      Social History     Socioeconomic History    Marital status:    Tobacco Use    Smoking status: Every Day     Current packs/day: 2.00     Types: Cigarettes   Vaping Use    Vaping status: Never Used   Substance and Sexual Activity    Alcohol use: No    Drug use: No    Sexual " activity: Defer      Imaging:  Narrative & Impression   PROCEDURE: CT ANGIOGRAM CHEST PULMONARY EMBOLISM-     HISTORY: hypoxia     COMPARISON: 8/18/2018.     TECHNIQUE: Multiple axial CT images were obtained from the thoracic  inlet through the upper abdomen following the administration of Isovue  300 per the CT PE protocol. Coronal and oblique 3D MIP images were  reconstructed from the original axial data set. This study was performed  with techniques to keep radiation doses as low as reasonably achievable  (ALARA). Individualized dose reduction techniques using automated  exposure control or adjustment of mA and/or kV according to the patient  size were employed.     FINDINGS: There are no filling defects within the pulmonary arteries to  suggest an embolus. The thoracic aorta is normal in caliber with no  evidence of dissection. The heart is normal in size. There are no  pleural or pericardial effusions. There is no adenopathy. The thyroid  gland is unremarkable. Lung windows reveal centrilobular emphysema.  There are no focal opacities or suspicious pulmonary nodules. Note is  made of a cyst in the superior pole of the right kidney. Bone windows  reveal no acute osseous abnormalities.     IMPRESSION:  No evidence of pulmonary embolus or dissection.              This report was finalized on 1/29/2025 4:10 PM by Napoleon Coronel M.D..     Labs:  Sodium  143  01/30 0022  Potassium  4.0  01/30 0837  Chloride  104  01/30 0022  CO2  30.0  01/30 0022  BUN  15  01/30 0022  Creatinine  0.83  01/30 0022  Glucose  93  01/30 0022  Hemoglobin  9.9  01/30 0022  Hematocrit  32.3  01/30 0022  WBC  12.62  01/30 0022  Platelets  294  01/30 0022  PTT  31.9  01/29 1318  Protime  12.8  01/29 1318  INR  0.95  01/29 1318     Diet Orders (active) (From admission, onward)       Start     Ordered    01/30/25 1800  Dietary Nutrition Supplements Other (See Comment); Boost  Daily With Lunch & Dinner       01/30/25 1316    01/29/25 1912   Diet: Cardiac; Healthy Heart (2-3 Na+); Fluid Consistency: Thin (IDDSI 0)  Diet Effective Now         01/29/25 1911                  He was observed on ra w/o complications.     Patient was positioned upright and centered in bed to accept multiple po presentations of solid cracker, puree, and thin liquids via spoon, cup, and straw. Patient was able to self provide po trials.     Facial/oral structures were symmetrical upon observation. Lingual protrusion revealed no deviation. Oral mucosa were moist, pink, and clean. Secretions were clear, thin, and well controlled. OROM/JACK was wfl to imitate oral postures. Gag was not assessed. Volitional cough was intact w/ adequate  intensity, clear in quality, non-productive. Voice was adequate in intensity, clear in quality w/ intelligible speech. He was oriented, followed directives and participated in conversational exchanges.     Upon po presentations, adequate bolus anticipation and acceptance w/ good labial seal for bolus clearance via spoon bowl, cup rim stability and suction via straw. Bolus formation, manipulation and control were wfl w/ rotary mastication pattern. A-p transit was timely w/o significant oral residue appreciated. No overt s/s aspiration before the swallow.      Pharyngeal swallow was timely w/ adequate hyolaryngeal elevation per palpation. No overt s/s aspiration evidenced across this assessment. No silent aspiration suspected. Patient denied odynophagia.    Impression: Per this assessment, Mr. Urias presented w/ wfl oropharyngeal swallow w/o s/s aspiration. No s/s indicative of silent aspiration. No odynophagia reported.      SLP Recommendation and Plan  1. Regular consistencies, thin liquids.    2. Medications whole in puree/thins.   3. Upright and centered for all po intake.  4. VICKEY precautions.  5. Oral care protocol.  No further formal SLP f/u warranted/recommended at this time.    D/w patient results and recommendations w/ verbal  agreement.    Thank you for allowing me to participate in the care of your patient-  Sherine Paz M.A., CCC-SLP    EDUCATION  The patient has been educated in the following areas:   Dysphagia (Swallowing Impairment) Oral Care/Hydration.      Time Calculation:     Therapy Charges for Today       Code Description Service Date Service Provider Modifiers Qty    [id number]  ST EVAL ORAL PHARYNG SWALLOW 4 1/30/2025 Sherine Paz MA,CCC-SLP GN 1            Sherine Paz MA,CCC-SLP  1/30/2025

## 2025-01-30 NOTE — THERAPY EVALUATION
Patient Name: Nj Urias  : 1960    MRN: [id number]                              Today's Date: 2025       Admit Date: 2025    Visit Dx:     ICD-10-CM ICD-9-CM   1. Acute congestive heart failure, unspecified heart failure type  I50.9 428.0   2. Thrush  B37.0 112.0   3. Acute respiratory failure with hypoxia  J96.01 518.81     Patient Active Problem List   Diagnosis    CHF (congestive heart failure)     Past Medical History:   Diagnosis Date    COPD (chronic obstructive pulmonary disease)     Hypertension      History reviewed. No pertinent surgical history.   General Information       Row Name 25 1526          OT Time and Intention    Subjective Information no complaints  -     Document Type evaluation  -     Mode of Treatment individual therapy  -     Patient Effort good  -     Symptoms Noted During/After Treatment none  -     Comment Patient agreeable to OT evaluation.  -       Row Name 25 1526          General Information    Patient Profile Reviewed yes  -     Prior Level of Function independent:;ADL's;all household mobility;community mobility  cane  -     Existing Precautions/Restrictions no known precautions/restrictions  -     Barriers to Rehab none identified  -       Row Name 25 1526          Occupational Profile    Reason for Services/Referral (Occupational Profile) Patient admitted to [facility] on 2025. He was referred for OT evaluation due to change in functional performance with ADLs, functional mobility and/or transfers.  -       Row Name 25 1526          Living Environment    People in Home alone  -       Row Name 25 1526          Cognition    Orientation Status (Cognition) oriented x 4  -       Row Name 25 1526          Safety Issues/Impairments Affecting Functional Mobility    Impairments Affecting Function (Mobility) pain  -               User Key  (r) = Recorded By, (t) = Taken By, (c) = Cosigned  By      Initials Name Provider Type     Lucie Gallegos OT Occupational Therapist                     Mobility/ADL's       Row Name 01/30/25 1540          Bed Mobility    Bed Mobility bed mobility (all) activities  -     All Activities, [place] (Bed Mobility) independent  -       Row Name 01/30/25 1540          Transfers    Transfers sit-stand transfer;stand-sit transfer  -       Row Name 01/30/25 1540          Sit-Stand Transfer    Sit-Stand [place] (Transfers) independent  -       Row Name 01/30/25 1540          Stand-Sit Transfer    Stand-Sit [place] (Transfers) independent  -       Row Name 01/30/25 1540          Activities of Daily Living    BADL Assessment/Intervention bathing;upper body dressing;lower body dressing;grooming;toileting;feeding  -       Row Name 01/30/25 1540          Bathing Assessment/Intervention    [place] Level (Bathing) bathing skills;independent;modified [place]  -       Row Name 01/30/25 1540          Upper Body Dressing Assessment/Training    [place] Level (Upper Body Dressing) upper body dressing skills;independent  -       Row Name 01/30/25 1540          Lower Body Dressing Assessment/Training    [place] Level (Lower Body Dressing) lower body dressing skills;modified [place]  -       Row Name 01/30/25 1540          Grooming Assessment/Training    [place] Level (Grooming) grooming skills;independent  -       Row Name 01/30/25 1540          Toileting Assessment/Training    [place] Level (Toileting) toileting skills;independent;modified [place]  -       Row Name 01/30/25 1540          Self-Feeding Assessment/Training    [place] Level (Feeding) feeding skills;independent  -               User Key  (r) = Recorded By, (t) = Taken By, (c) = Cosigned By      Initials Name Provider Type    Lucie Adams OT Occupational Therapist                   Obj/Interventions       Row Name 01/30/25 1541           Sensory Assessment (Somatosensory)    Sensory Assessment (Somatosensory) UE sensation intact  -[facility] Name 01/30/25 1541          Vision Assessment/Intervention    Visual Impairment/Limitations WFL  -[facility] Name 01/30/25 1541          Range of Motion Comprehensive    General Range of Motion bilateral upper extremity ROM WFL  -[facility] Name 01/30/25 1541          Strength Comprehensive (MMT)    General Manual Muscle Testing (MMT) Assessment no strength deficits identified  -[facility] Name 01/30/25 1541          Motor Skills    Motor Skills coordination;functional endurance  -     Coordination WNL;bilateral;upper extremity  -     Functional Endurance good  -[facility] Name 01/30/25 1541          Balance    Balance Assessment sitting static balance;standing static balance  -     Static Sitting Balance independent  -     Static Standing Balance independent  -               User Key  (r) = Recorded By, (t) = Taken By, (c) = Cosigned By      Initials Name Provider Type    Lucie Adams, ESTEE Occupational Therapist                   Goals/Plan    No documentation.                  Clinical Impression       [facility] Name 01/30/25 1542          Pain Assessment    Pretreatment Pain Rating 1/10  -     Posttreatment Pain Rating 1/10  -     Pain Location back  -     Pain Side/Orientation generalized  -[facility] Name 01/30/25 1542          Plan of Care Review    Plan of Care Reviewed With patient;significant other  -     Progress no change  -     Outcome Evaluation Patient seen for OT evaluation. He presents at modified independent to independent level with simple ADLs and functional mobility/transfers. Patient does report he has pain, but states this is present at prior level. Patient reports no concerns and states he has been up in room. No further OT services at this time.  -[facility] Name 01/30/25 1542          Therapy Assessment/Plan (OT)    Criteria for Skilled Therapeutic  Interventions Met (OT) no;no problems identified which require skilled intervention  -     Therapy Frequency (OT) evaluation only  -       Row Name 01/30/25 1542          Therapy Plan Review/Discharge Plan (OT)    Anticipated Discharge Disposition (OT) home  -       Row Name 01/30/25 1542          Positioning and Restraints    Pre-Treatment Position in bed  -     Post Treatment Position bed  -     In Bed fowlers;call light within reach;encouraged to call for assist;with family/caregiver  -               User Key  (r) = Recorded By, (t) = Taken By, (c) = Cosigned By      Initials Name Provider Type    Lucie Adams OT Occupational Therapist                   Outcome Measures       Row Name 01/30/25 0905          How much help from another person do you currently need...    Turning from your back to your side while in flat bed without using bedrails? 4  -AG     Moving from lying on back to sitting on the side of a flat bed without bedrails? 4  -AG     Moving to and from a bed to a chair (including a wheelchair)? 4  -AG     Standing up from a chair using your arms (e.g., wheelchair, bedside chair)? 4  -AG     Climbing 3-5 steps with a railing? 3  -AG     To walk in hospital room? 4  -AG     AM-PAC 6 Clicks Score (PT) 23  -AG               User Key  (r) = Recorded By, (t) = Taken By, (c) = Cosigned By      Initials Name Provider Type    Kathi Rico, SRINI Registered Nurse                    Occupational Therapy Education        No education to display                  OT Recommendation and Plan  Therapy Frequency (OT): evaluation only  Plan of Care Review  Plan of Care Reviewed With: patient, significant other  Progress: no change  Outcome Evaluation: Patient seen for OT evaluation. He presents at modified independent to independent level with simple ADLs and functional mobility/transfers. Patient does report he has pain, but states this is present at prior level. Patient reports no concerns and states  he has been up in room. No further OT services at this time.     Time Calculation:         Time Calculation- OT       Row Name 01/30/25 1544             Time Calculation- OT    OT Received On 01/30/25  -                User Key  (r) = Recorded By, (t) = Taken By, (c) = Cosigned By      Initials Name Provider Type    Lucie Adams OT Occupational Therapist                  Therapy Charges for Today       Code Description Service Date Service Provider Modifiers Qty    [id number] HC OT EVAL LOW COMPLEXITY 4 1/30/2025 Lucie Gallegos OT GO 1                 Lucie Gallegos OT  1/30/2025

## 2025-01-30 NOTE — CASE MANAGEMENT/SOCIAL WORK
Discharge Planning Assessment   [place]     Patient Name: Nj Urias  MRN: [id number]  Today's Date: 1/30/2025    Admit Date: 1/29/2025       Discharge Plan       Row Name 01/30/25 1409       Plan    Plan CM f/u with pt and S/O at the bedside. Pt lives at home alone in [facility] and plans to return at d/c. Pt has a straight cane and nebulizer via unknown provider. Pt does not have HH. Pt's S/O will transport. CM will follow and assist as needed.    Patient/Family in Agreement with Plan yes             Sudha Cisse RN

## 2025-01-30 NOTE — PAYOR COMM NOTE
"CONTACT: KASHIF URIAS RN  UTILIZATION MANAGEMENT DEPT.  [address] [address]  PHONE: [phone]  FAX: [phone]      INPATIENT AUTH REQUEST    REF#[id number]      Nj Urias (64 y.o. Male)       Date of Birth   1960    Social Security Number       Address   [address] [address]    Home Phone   [phone]    MRN   [id number]       Taoism   Catholic    Marital Status                               Admission Date   1/29/25    Admission Type   Emergency    Admitting Provider   Alejandro Rojas Jr., MD    Attending Provider   Alejandro Rojas Jr., MD    Department, Room/Bed   [address], [facility]/       Discharge Date       Discharge Disposition       Discharge Destination                                 Attending Provider: Alejandro Rojas Jr., MD    Allergies: Codeine, Demerol [Meperidine]    Isolation: None   Infection: None   Code Status: CPR    Ht: 185.4 cm (73\")   Wt: 84 kg (185 lb 3.2 oz)    Admission Cmt: None   Principal Problem: CHF (congestive heart failure) [I50.9]                   Active Insurance as of 1/29/2025       Primary Coverage       Payor Plan Insurance Group Employer/Plan Group    HUMANA MEDICARE REPLACEMENT HUMANA MED ADV PPO [id number]       Payor Plan Address Payor Plan Phone Number Payor Plan Fax Number Effective Dates    [address] [address] [phone]  2/1/2024 - None Entered    [facility] [id number]         Subscriber Name Subscriber Birth Date Member ID       NJ URIAS 1960 [id number]                     Emergency Contacts        (Rel.) Home Phone Work Phone Mobile Phone    Ilia Urias (Grandchild) -- -- [phone]    Sunni Delvalle (Significant Other) -- -- [phone]                 History & Physical        Alejandro Rojas Jr., MD at 01/29/25 [address] [place] [address]   HOSPITALIST HISTORY AND PHYSICAL  Date: 1/29/2025   Patient Name: Nj Encarnacion " Adonay  : 1960  MRN: [id number]  Primary Care Physician:  Reynaldo Rodriguez MD  Date of admission: 2025    Subjective  Subjective     Chief Complaint: SOB    HPI:    Nj Urias is a 64 y.o. male past medical history of COPD, hypertension that presents to the emergency department for evaluation of shortness of breath since yesterday evening.  He also endorses bilateral lower extremity swelling and sore throat.  In the emergency department found to have new onset congestive heart failure and started on IV Lasix.  Patient denies any fevers, chills, sweats, nausea, vomiting, chest pain, palpitations, abdominal pain diarrhea constipation, dysuria, weakness, rash.  He also endorse sore throat and was found to have thrush.  Will be started on Lasix and nystatin and admitted for ongoing monitoring and management.      Personal History     Past Medical History:  Past Medical History:   Diagnosis Date    COPD (chronic obstructive pulmonary disease)     Hypertension          Past Surgical History:  No past surgical history on file.      Family History:   No family history on file.      Social History:   Social History     Tobacco Use    Smoking status: Every Day     Current packs/day: 2.00     Types: Cigarettes   Substance Use Topics    Alcohol use: No    Drug use: No         Home Medications:  HYDROcodone-acetaminophen and lisinopril    Allergies:  Allergies   Allergen Reactions    Codeine GI Intolerance    Demerol [Meperidine] Anxiety       Review of Systems   All systems were reviewed and negative except for: SOB, sore throat, BL LE swelling    Objective  Objective     Vitals:   Temp:  [98.3 °F (36.8 °C)-98.4 °F (36.9 °C)] 98.4 °F (36.9 °C)  Heart Rate:  [71-90] 71  Resp:  [17-20] 17  BP: (119-159)/(61-98) 121/98  Flow (L/min) (Oxygen Therapy):  [2] 2    Physical Exam    Constitutional: Awake, alert, no acute distress   Eyes: Pupils equal, sclerae anicteric, no conjunctival injection   HENT: NCAT,  mucous membranes moist   Neck: Supple, no thyromegaly, no lymphadenopathy, trachea midline   Respiratory: Clear to auscultation bilaterally, nonlabored respirations    Cardiovascular: RRR, no murmurs, rubs, or gallops, palpable pedal pulses bilaterally   Gastrointestinal: Positive bowel sounds, soft, nontender, nondistended   Musculoskeletal: +2 bilateral ankle edema, no clubbing or cyanosis to extremities   Psychiatric: Appropriate affect, cooperative   Neurologic: Oriented x 3, strength symmetric in all extremities, Cranial Nerves grossly intact to confrontation, speech clear   Skin: No rashes     Result Review   Result Review:  I have personally reviewed the results from the time of this admission to 1/29/2025 16:57 EST and agree with these findings:  [x]  Laboratory  []  Microbiology  [x]  Radiology  []  EKG/Telemetry   []  Cardiology/Vascular   []  Pathology  []  Old records  []  Other:      Assessment & Plan  Assessment / Plan     Assessment/Plan:   Congestive heart failure, unknown ejection fraction: Will continue diurese with IV Lasix twice daily.  Check echocardiogram.  Submental oxygen as needed.  Will monitor on telemetry.  Monitor renal function and electrolytes while on IV diuresis.  Thrush: Nystatin swish and swallow  Hypertension: Add back home medications, monitor and titrate as needed.  Hx COPD without exacerbation: Continue home regimen      VTE Prophylaxis:  Pharmacologic VTE prophylaxis orders are signed & held.          CODE STATUS:    Code Status (Patient has no pulse and is not breathing): CPR (Attempt to Resuscitate)  Medical Interventions (Patient has pulse or is breathing): Full Support      Admission Status:  I believe this patient meets inpatient status.    Electronically signed by Alejandro Rojas Jr, MD, 01/29/25, 4:57 PM EST.             Electronically signed by Alejandro Rojas Jr., MD at 01/29/25 1702          Emergency Department Notes        Michael Marley RN at 01/29/25 8417           Report called to [facility] and transport put in       Electronically signed by Michael Marley RN at 01/29/25 1819       Arielle Silva PA at 01/29/25 1259       Attestation signed by Gil Tafoya MD at 01/29/25 1930        SUPERVISE: For this patient encounter, I reviewed the APC's documentation, treatment plan, and medical decision making.  Gil Tafoya MD 1/29/2025 19:30 EST                         Subjective   History of Present Illness  64-year-old male presents to the ED today for shortness of breath and peripheral edema.  He states his symptoms started yesterday and have worsened today.  He states the shortness of breath is worse if he lays flat and is also worse with exertion.  He denies any chest pain but states he did have some pain in his left arm earlier today.  He states he does have a history of COPD but does not wear home oxygen.  He states he uses some inhalers at home for this.  He does smoke a pack of cigarettes per day.  The patient also reports that he has a rash in his mouth that feels like it goes down his throat.  He states this also started yesterday.  He states that has made his voice feel hoarse.  He does use a steroid inhaler.  He does not think he has been on any steroid pills recently.  He does have a history of chronic pain, hypertension, hyperlipidemia and GERD.  He denies any history of diabetes or coronary artery disease.    History provided by:  Patient  Shortness of Breath  Severity:  Moderate  Onset quality:  Gradual  Duration:  1 day  Timing:  Constant  Progression:  Worsening  Chronicity:  New  Relieved by:  Nothing  Worsened by:  Exertion and activity  Associated symptoms: sore throat    Associated symptoms: no abdominal pain, no chest pain, no cough, no diaphoresis, no ear pain, no fever, no headaches, no hemoptysis, no neck pain, no rash, no sputum production, no syncope, no swollen glands, no vomiting and no wheezing    Risk factors: tobacco use         Review of Systems   Constitutional:  Negative for diaphoresis and fever.   HENT:  Positive for mouth sores, sore throat and voice change. Negative for ear pain and trouble swallowing.    Eyes: Negative.    Respiratory:  Positive for shortness of breath. Negative for cough, hemoptysis, sputum production and wheezing.    Cardiovascular:  Positive for leg swelling. Negative for chest pain, palpitations and syncope.   Gastrointestinal: Negative.  Negative for abdominal pain and vomiting.   Genitourinary: Negative.    Musculoskeletal:  Negative for neck pain.   Skin: Negative.  Negative for rash.   Neurological:  Negative for headaches.   Psychiatric/Behavioral: Negative.     All other systems reviewed and are negative.      Past Medical History:   Diagnosis Date    COPD (chronic obstructive pulmonary disease)     Hypertension        Allergies   Allergen Reactions    Codeine GI Intolerance    Demerol [Meperidine] Anxiety       No past surgical history on file.    No family history on file.    Social History     Socioeconomic History    Marital status:    Tobacco Use    Smoking status: Every Day     Current packs/day: 2.00     Types: Cigarettes   Substance and Sexual Activity    Alcohol use: No    Drug use: No    Sexual activity: Defer           Objective   Physical Exam  Vitals and nursing note reviewed.   Constitutional:       General: He is not in acute distress.     Appearance: He is well-developed. He is not diaphoretic.   HENT:      Head: Normocephalic and atraumatic.      Mouth/Throat:      Mouth: Mucous membranes are moist.      Comments: Patient appears to have thrush in his mouth, white plaques noted on his tongue and in his posterior oropharynx, voice is mildly hoarse  Eyes:      Extraocular Movements: Extraocular movements intact.      Pupils: Pupils are equal, round, and reactive to light.   Neck:      Vascular: No JVD.      Trachea: No tracheal deviation.   Cardiovascular:      Rate and Rhythm:  Normal rate and regular rhythm.      Pulses: Normal pulses.      Heart sounds: Normal heart sounds.   Pulmonary:      Effort: Pulmonary effort is normal.      Breath sounds: Normal breath sounds.   Chest:      Chest wall: No tenderness.   Abdominal:      General: Bowel sounds are normal.      Palpations: Abdomen is soft.      Tenderness: There is no abdominal tenderness.   Musculoskeletal:         General: Normal range of motion.      Cervical back: Normal range of motion and neck supple.      Right lower leg: Edema present.      Left lower leg: Edema present.      Comments: 2+ pitting edema to bilateral lower extremities   Lymphadenopathy:      Cervical: No cervical adenopathy.   Skin:     General: Skin is warm and dry.      Capillary Refill: Capillary refill takes less than 2 seconds.   Neurological:      General: No focal deficit present.      Mental Status: He is alert and oriented to person, place, and time.   Psychiatric:         Mood and Affect: Mood normal.         Procedures      Results for orders placed or performed during the hospital encounter of 01/29/25   ECG 12 Lead ED Triage Standing Order; SOA    Collection Time: 01/29/25 12:48 PM   Result Value Ref Range    QT Interval 368 ms    QTC Interval 429 ms   COVID-19 and FLU A/B PCR, 1 HR TAT - Swab, Nasopharynx    Collection Time: 01/29/25  1:18 PM    Specimen: Nasopharynx; Swab   Result Value Ref Range    COVID19 Not Detected Not Detected - Ref. Range    Influenza A PCR Not Detected Not Detected    Influenza B PCR Not Detected Not Detected   Rapid Strep A Screen - Swab, Throat    Collection Time: 01/29/25  1:18 PM    Specimen: Throat; Swab   Result Value Ref Range    Strep A Ag Negative Negative   Comprehensive Metabolic Panel    Collection Time: 01/29/25  1:18 PM    Specimen: Blood   Result Value Ref Range    Glucose 86 65 - 99 mg/dL    BUN 16 8 - 23 mg/dL    Creatinine 0.78 0.76 - 1.27 mg/dL    Sodium 141 136 - 145 mmol/L    Potassium 4.4 3.5 - 5.2  mmol/L    Chloride 108 (H) 98 - 107 mmol/L    CO2 24.1 22.0 - 29.0 mmol/L    Calcium 8.8 8.6 - 10.5 mg/dL    Total Protein 6.7 6.0 - 8.5 g/dL    Albumin 3.7 3.5 - 5.2 g/dL    ALT (SGPT) 12 1 - 41 U/L    AST (SGOT) 19 1 - 40 U/L    Alkaline Phosphatase 61 39 - 117 U/L    Total Bilirubin 0.4 0.0 - 1.2 mg/dL    Globulin 3.0 gm/dL    A/G Ratio 1.2 g/dL    BUN/Creatinine Ratio 20.5 7.0 - 25.0    Anion Gap 8.9 5.0 - 15.0 mmol/L    eGFR 99.0 >60.0 mL/min/1.73   BNP    Collection Time: 01/29/25  1:18 PM    Specimen: Blood   Result Value Ref Range    proBNP 1,805.0 (H) 0.0 - 900.0 pg/mL   High Sensitivity Troponin T    Collection Time: 01/29/25  1:18 PM    Specimen: Blood   Result Value Ref Range    HS Troponin T 20 <22 ng/L   CBC Auto Differential    Collection Time: 01/29/25  1:18 PM    Specimen: Blood   Result Value Ref Range    WBC 17.67 (H) 3.40 - 10.80 10*3/mm3    RBC 3.37 (L) 4.14 - 5.80 10*6/mm3    Hemoglobin 9.9 (L) 13.0 - 17.7 g/dL    Hematocrit 32.3 (L) 37.5 - 51.0 %    MCV 95.8 79.0 - 97.0 fL    MCH 29.4 26.6 - 33.0 pg    MCHC 30.7 (L) 31.5 - 35.7 g/dL    RDW 16.8 (H) 12.3 - 15.4 %    RDW-SD 58.8 (H) 37.0 - 54.0 fl    MPV 10.9 6.0 - 12.0 fL    Platelets 287 140 - 450 10*3/mm3    Neutrophil % 78.1 (H) 42.7 - 76.0 %    Lymphocyte % 12.6 (L) 19.6 - 45.3 %    Monocyte % 6.5 5.0 - 12.0 %    Eosinophil % 1.9 0.3 - 6.2 %    Basophil % 0.4 0.0 - 1.5 %    Immature Grans % 0.5 0.0 - 0.5 %    Neutrophils, Absolute 13.82 (H) 1.70 - 7.00 10*3/mm3    Lymphocytes, Absolute 2.23 0.70 - 3.10 10*3/mm3    Monocytes, Absolute 1.14 (H) 0.10 - 0.90 10*3/mm3    Eosinophils, Absolute 0.33 0.00 - 0.40 10*3/mm3    Basophils, Absolute 0.07 0.00 - 0.20 10*3/mm3    Immature Grans, Absolute 0.08 (H) 0.00 - 0.05 10*3/mm3    nRBC 0.0 0.0 - 0.2 /100 WBC   Protime-INR    Collection Time: 01/29/25  1:18 PM    Specimen: Blood   Result Value Ref Range    Protime 12.8 11.6 - 15.1 Seconds    INR 0.95 0.90 - 1.10   aPTT    Collection Time: 01/29/25   1:18 PM    Specimen: Blood   Result Value Ref Range    PTT 31.9 24.5 - 35.9 seconds   Magnesium    Collection Time: 01/29/25  1:18 PM    Specimen: Blood   Result Value Ref Range    Magnesium 1.8 1.6 - 2.4 mg/dL   TSH    Collection Time: 01/29/25  1:18 PM    Specimen: Blood   Result Value Ref Range    TSH 0.627 0.270 - 4.200 uIU/mL   T4, Free    Collection Time: 01/29/25  1:18 PM    Specimen: Blood   Result Value Ref Range    Free T4 1.01 0.92 - 1.68 ng/dL   Procalcitonin    Collection Time: 01/29/25  1:18 PM    Specimen: Blood   Result Value Ref Range    Procalcitonin 0.07 0.00 - 0.25 ng/mL   C-reactive Protein    Collection Time: 01/29/25  1:18 PM    Specimen: Blood   Result Value Ref Range    C-Reactive Protein 4.05 (H) 0.00 - 0.50 mg/dL   Sedimentation Rate    Collection Time: 01/29/25  1:18 PM    Specimen: Blood   Result Value Ref Range    Sed Rate 28 (H) 0 - 20 mm/hr   Green Top (Gel)    Collection Time: 01/29/25  1:18 PM   Result Value Ref Range    Extra Tube Hold for add-ons.    Lavender Top    Collection Time: 01/29/25  1:18 PM   Result Value Ref Range    Extra Tube hold for add-on    Gold Top - SST    Collection Time: 01/29/25  1:18 PM   Result Value Ref Range    Extra Tube Hold for add-ons.    Light Blue Top    Collection Time: 01/29/25  1:18 PM   Result Value Ref Range    Extra Tube Hold for add-ons.    Blood Gas, Arterial With Co-Ox    Collection Time: 01/29/25  1:20 PM    Specimen: Arterial Blood   Result Value Ref Range    Site Right Radial     Toño's Test Positive     pH, Arterial 7.370 7.350 - 7.450 pH units    pCO2, Arterial 47.3 (H) 35.0 - 45.0 mm Hg    pO2, Arterial 52.6 (C) 83.0 - 108.0 mm Hg    HCO3, Arterial 27.3 (H) 20.0 - 26.0 mmol/L    Base Excess, Arterial 1.6 0.0 - 2.0 mmol/L    O2 Saturation, Arterial 89.9 (L) 94.0 - 99.0 %    Hemoglobin, Blood Gas 9.8 (L) 14 - 18 g/dL    Hematocrit, Blood Gas 30.0 (L) 38.0 - 51.0 %    Oxyhemoglobin 83.2 (L) 94 - 99 %    Methemoglobin 0.60 0.00 - 3.00 %     Carboxyhemoglobin 6.9 (H) 0 - 5 %    A-a DO2 36.6 0.0 - 300.0 mmHg    CO2 Content 28.8 22 - 33 mmol/L    Barometric Pressure for Blood Gas 726 mmHg    Modality Room Air     FIO2 21 %    Ventilator Mode NA     Notified Who A Silva     Notified By [id number]     Notified Time 01/29/2025 13:24     Collected by [id number]     pH, Temp Corrected      pCO2, Temperature Corrected      pO2, Temperature Corrected     Lactic Acid, Plasma    Collection Time: 01/29/25  2:12 PM    Specimen: Blood   Result Value Ref Range    Lactate 0.6 0.5 - 2.0 mmol/L   High Sensitivity Troponin T 1Hr    Collection Time: 01/29/25  2:12 PM    Specimen: Blood   Result Value Ref Range    HS Troponin T 22 (H) <22 ng/L    Troponin T Numeric Delta 2 Abnormal if >/=3 ng/L   Hepatitis Panel, Acute    Collection Time: 01/29/25  2:12 PM    Specimen: Blood   Result Value Ref Range    Hepatitis B Surface Ag Non-Reactive Non-Reactive    Hep A IgM Non-Reactive Non-Reactive    Hep B C IgM Non-Reactive Non-Reactive    Hepatitis C Ab Non-Reactive Non-Reactive   HIV-1 / O / 2 Ag / Antibody    Collection Time: 01/29/25  2:12 PM    Specimen: Blood   Result Value Ref Range    HIV-1/ HIV-2 Non-Reactive Non-Reactive   Urinalysis With Microscopic If Indicated (No Culture) - Urine, Clean Catch    Collection Time: 01/29/25  3:54 PM    Specimen: Urine, Clean Catch   Result Value Ref Range    Color, UA Yellow Yellow, Straw    Appearance, UA Clear Clear    pH, UA 6.0 5.0 - 8.0    Specific Gravity, UA 1.010 1.005 - 1.030    Glucose, UA Negative Negative    Ketones, UA Negative Negative    Bilirubin, UA Negative Negative    Blood, UA Negative Negative    Protein, UA Negative Negative    Leuk Esterase, UA Negative Negative    Nitrite, UA Negative Negative    Urobilinogen, UA 0.2 E.U./dL 0.2 - 1.0 E.U./dL          ED Course  ED Course as of 01/29/25 1706   Wed Jan 29, 2025   1325 pO2, Arterial(!!): 52.6  Patient to be started on 2L of oxygen [AH]   1356 XR Chest AP  FINDINGS: The  heart is normal in size. The mediastinum is unremarkable.  The lungs are clear. There is no pneumothorax. There are no acute  osseous abnormalities.     IMPRESSION:  No acute cardiopulmonary process.   [AH]   1519 ECG demonstrates normal sinus rhythm at 82 bpm.  KS and QTc interval and normalizes QRS duration.  There are no acute ST-T wave changes. [RA]   1520 US Venous Doppler Lower Extremity Bilateral (duplex)  FINDINGS: Normal phasic flow was noted in the visualized deep venous  system. No intraluminal increased echogenicity is noted to suggest  thrombus. There is normal compression and augmentation of the venous  structures.  No abnormal venous collaterals are seen.     IMPRESSION:  No evidence of deep venous thrombosis.   [AH]   1646 Dr. Rojas to admit []      ED Course User Index  [AH] Arielle Silva PA  [RA] Gil Tafoya MD                                                       Medical Decision Making  64-year-old male who presents to the ED today for shortness of breath and peripheral edema.  Patient was hypoxic on room air.  He was started on 2 L of oxygen and tolerated that well.  He was given 80 mg of Lasix IV and has been diuresing well.  He states he has been to the bathroom about 5 times with significant urine output each time.  Patient's EKG showed no acute ischemia.  His troponins were stable.  Chest x-ray and CT of the chest were unremarkable.  The patient also appears to have thrush in his mouth.  This also started yesterday.  He does use a steroid inhaler.  He tested negative for HIV and hepatitis today.  I discussed the patient with Dr. Rojas with our hospitalist service team and he will be admitted.    Problems Addressed:  Acute congestive heart failure, unspecified heart failure type: complicated acute illness or injury  Acute respiratory failure with hypoxia: complicated acute illness or injury  Thrush: complicated acute illness or injury    Amount and/or Complexity of Data  Reviewed  Labs: ordered. Decision-making details documented in ED Course.  Radiology: ordered. Decision-making details documented in ED Course.  ECG/medicine tests: ordered.    Risk  Prescription drug management.  Decision regarding hospitalization.        Final diagnoses:   Acute congestive heart failure, unspecified heart failure type   Thrush   Acute respiratory failure with hypoxia       ED Disposition  ED Disposition       ED Disposition   Decision to Admit    Condition   --    Comment   Level of Care: Telemetry [5]   Diagnosis: CHF (congestive heart failure) [459092]   Certification: I Certify That Inpatient Hospital Services Are Medically Necessary For Greater Than 2 Midnights                 No follow-up provider specified.       Medication List      No changes were made to your prescriptions during this visit.            Arielle Silva PA  01/29/25 1706      Electronically signed by Gil Tafoya MD at 01/29/25 1930       Facility-Administered Medications as of 1/30/2025   Medication Dose Route Frequency Provider Last Rate Last Admin    acetaminophen (TYLENOL) tablet 650 mg  650 mg Oral Q4H PRN Alejandro Rojas Jr., MD        Or    acetaminophen (TYLENOL) 160 MG/5ML oral solution 650 mg  650 mg Oral Q4H PRN Alejandro Rojas Jr., MD        Or    acetaminophen (TYLENOL) suppository 650 mg  650 mg Rectal Q4H PRN Alejandro Rojas Jr., MD        sennosides-docusate (PERICOLACE) 8.6-50 MG per tablet 2 tablet  2 tablet Oral BID PRN Alejandro Rojas Jr., MD        And    polyethylene glycol (MIRALAX) packet 17 g  17 g Oral Daily PRN Alejandro Rojas Jr., MD        And    bisacodyl (DULCOLAX) EC tablet 5 mg  5 mg Oral Daily PRN Alejandro Rojas Jr., MD        And    bisacodyl (DULCOLAX) suppository 10 mg  10 mg Rectal Daily PRN Alejandro Rojas Jr., MD        Calcium Replacement - Follow Nurse / BPA Driven Protocol   Not Applicable PRN Alejandro Rojas Jr., MD        [COMPLETED] furosemide (LASIX) injection 80 mg   80 mg Intravenous Once Arielle Silva PA   80 mg at 01/29/25 1405    furosemide (LASIX) injection 80 mg  80 mg Intravenous BID Alejandro Rojas Jr., MD        heparin (porcine) 5000 UNIT/ML injection 5,000 Units  5,000 Units Subcutaneous Q8H Alejandro Rojas Jr., MD   5,000 Units at 01/30/25 0546    HYDROcodone-acetaminophen (NORCO) 5-325 MG per tablet 1 tablet  1 tablet Oral Q6H PRN Alejandro Rojas Jr., MD   1 tablet at 01/29/25 2236    [COMPLETED] hydrOXYzine (ATARAX) tablet 25 mg  25 mg Oral Once Bijan Medina PA-C   25 mg at 01/29/25 2340    [COMPLETED] iopamidol (ISOVUE-370) 76 % injection 100 mL  100 mL Intravenous Once in imaging Gil Tafoya MD   70 mL at 01/29/25 1604    ipratropium-albuterol (DUO-NEB) nebulizer solution 3 mL  3 mL Nebulization 4x Daily - RT Alejandro Rojas Jr., MD   3 mL at 01/30/25 0637    lisinopril (PRINIVIL,ZESTRIL) tablet 10 mg  10 mg Oral Daily Alejandro Rojas Jr., MD        Magnesium Standard Dose Replacement - Follow Nurse / BPA Driven Protocol   Not Applicable PRN Alejandro Rojas Jr., MD        nitroglycerin (NITROSTAT) SL tablet 0.4 mg  0.4 mg Sublingual Q5 Min PRN Alejandro Rojas Jr., MD        nystatin (MYCOSTATIN) 100,000 unit/mL suspension 500,000 Units  5 mL Oral 4x Daily Alejandro Rojas Jr., MD   500,000 Units at 01/29/25 2103    Phosphorus Replacement - Follow Nurse / BPA Driven Protocol   Not Applicable PRN Alejandro Rojas Jr., MD        [COMPLETED] potassium chloride (KLOR-CON M20) CR tablet 20 mEq  20 mEq Oral Once Bijan Medina PA-C   20 mEq at 01/30/25 0546    Potassium Replacement - Follow Nurse / BPA Driven Protocol   Not Applicable PRN Alejandro Rojas Jr., MD        sodium chloride 0.9 % flush 10 mL  10 mL Intravenous PRN Gil Tafoya MD        sodium chloride 0.9 % flush 10 mL  10 mL Intravenous PRN Arielle Silva PA        sodium chloride 0.9 % flush 10 mL  10 mL Intravenous Q12H Alejandro Rojas Jr., MD   10 mL at 01/29/25 2103     sodium chloride 0.9 % flush 10 mL  10 mL Intravenous PRN Alejanrdo Rojas Jr., MD        sodium chloride 0.9 % infusion 40 mL  40 mL Intravenous PRN Alejandro Rojas Jr., MD         Orders (all)        Start     Ordered    01/30/25 0900  lisinopril (PRINIVIL,ZESTRIL) tablet 10 mg  Daily         01/29/25 1911 01/30/25 0900  furosemide (LASIX) injection 80 mg  2 Times Daily         01/29/25 1911 01/30/25 0824  Potassium  Timed         01/30/25 0223    01/30/25 0800  Oral Care  2 Times Daily       01/29/25 1911 01/30/25 0600  CBC Auto Differential  Morning Draw         01/29/25 1911 01/30/25 0600  Comprehensive Metabolic Panel  Morning Draw         01/29/25 1911 01/30/25 0500  potassium chloride (KLOR-CON M20) CR tablet 20 mEq  Once         01/30/25 0223 01/30/25 0100  ipratropium-albuterol (DUO-NEB) nebulizer solution 3 mL  4 Times Daily - RT         01/29/25 1911 01/29/25 2345  hydrOXYzine (ATARAX) tablet 25 mg  Once         01/29/25 2250    01/29/25 2200  heparin (porcine) 5000 UNIT/ML injection 5,000 Units  Every 8 Hours Scheduled         01/29/25 1911 01/29/25 2100  sodium chloride 0.9 % flush 10 mL  Every 12 Hours Scheduled         01/29/25 1911 01/29/25 2100  nystatin (MYCOSTATIN) 100,000 unit/mL suspension 500,000 Units  4 Times Daily         01/29/25 1911 01/29/25 2000  Vital Signs  Every 4 Hours       01/29/25 1911 01/29/25 2000  Strict Intake & Output  Every 4 Hours       01/29/25 1911 01/29/25 1924  SLP Consult: Eval & Treat Swallow Disorder  Once         01/29/25 1923 01/29/25 1923  Inpatient Nutrition Consult  Once        Provider:  (Not yet assigned)    01/29/25 1922 01/29/25 1912  Intake & Output  Every Shift       01/29/25 1911 01/29/25 1912  Weigh Patient  Once         01/29/25 1911 01/29/25 1912  Insert Peripheral IV  Once         01/29/25 1911 01/29/25 1912  Saline Lock & Maintain IV Access  Continuous,   Status:  Canceled         01/29/25 1911     01/29/25 1912  Daily Weights  Daily       01/29/25 1911 01/29/25 1912  Vital Signs  Per Hospital Policy/Protocol         01/29/25 1911 01/29/25 1912  Continuous Cardiac Monitoring  Continuous,   Status:  Canceled        Comments: Follow Standing Orders As Outlined in Process Instructions (Open Order Report to View Full Instructions)    01/29/25 1911 01/29/25 1912  Maintain IV Access  Continuous,   Status:  Canceled         01/29/25 1911 01/29/25 1912  Telemetry - Place Orders & Notify Provider of Results When Patient Experiences Acute Chest Pain, Dysrhythmia or Respiratory Distress  Continuous        Comments: Open Order Report to View Parameters Requiring Provider Notification    01/29/25 1911 01/29/25 1912  May Be Off Telemetry for Tests  Continuous         01/29/25 1911 01/29/25 1912  ReDs Vest  Once         01/29/25 1911 01/29/25 1912  Inpatient Heart Failure Navigator Consult  Once        Provider:  (Not yet assigned)    01/29/25 1911 01/29/25 1912  Continuous Cardiac Monitoring  Continuous        Comments: Follow Standing Orders As Outlined in Process Instructions (Open Order Report to View Full Instructions)    01/29/25 1911 01/29/25 1912  Maintain IV Access  Continuous         01/29/25 1911 01/29/25 1912  Telemetry - Place Orders & Notify Provider of Results When Patient Experiences Acute Chest Pain, Dysrhythmia or Respiratory Distress  Continuous        Comments: Open Order Report to View Parameters Requiring Provider Notification    01/29/25 1911 01/29/25 1912  May Be Off Telemetry for Tests  Continuous         01/29/25 1911 01/29/25 1912  Notify Provider (With Default Parameters)  Continuous        Comments: Open Order Report to View Parameters Requiring Provider Notification    01/29/25 1911 01/29/25 1912  Diet: Cardiac; Healthy Heart (2-3 Na+); Fluid Consistency: Thin (IDDSI 0)  Diet Effective Now,   Status:  Canceled         01/29/25 1911 01/29/25 1912   "Inpatient Admission  Once         01/29/25 1911 01/29/25 1912  Diet: Cardiac; Healthy Heart (2-3 Na+); Fluid Consistency: Thin (IDDSI 0)  Diet Effective Now         01/29/25 1911 01/29/25 1912  Adult Transthoracic Echo Complete With Contrast if Necessary Per Protocol  Once         01/29/25 1911 01/29/25 1911  Potassium Replacement - Follow Nurse / BPA Driven Protocol  As Needed         01/29/25 1911 01/29/25 1911  Magnesium Standard Dose Replacement - Follow Nurse / BPA Driven Protocol  As Needed         01/29/25 1911 01/29/25 1911  Phosphorus Replacement - Follow Nurse / BPA Driven Protocol  As Needed         01/29/25 1911 01/29/25 1911  Calcium Replacement - Follow Nurse / BPA Driven Protocol  As Needed         01/29/25 1911 01/29/25 1911  acetaminophen (TYLENOL) tablet 650 mg  Every 4 Hours PRN        Placed in \"Or\" Linked Group    01/29/25 1911 01/29/25 1911  acetaminophen (TYLENOL) 160 MG/5ML oral solution 650 mg  Every 4 Hours PRN        Placed in \"Or\" Linked Group    01/29/25 1911 01/29/25 1911  acetaminophen (TYLENOL) suppository 650 mg  Every 4 Hours PRN        Placed in \"Or\" Linked Group    01/29/25 1911 01/29/25 1911  sennosides-docusate (PERICOLACE) 8.6-50 MG per tablet 2 tablet  2 Times Daily PRN        Placed in \"And\" Linked Group    01/29/25 1911 01/29/25 1911  polyethylene glycol (MIRALAX) packet 17 g  Daily PRN        Placed in \"And\" Linked Group    01/29/25 1911 01/29/25 1911  bisacodyl (DULCOLAX) EC tablet 5 mg  Daily PRN        Placed in \"And\" Linked Group    01/29/25 1911 01/29/25 1911  bisacodyl (DULCOLAX) suppository 10 mg  Daily PRN        Placed in \"And\" Linked Group    01/29/25 1911 01/29/25 1911  HYDROcodone-acetaminophen (NORCO) 5-325 MG per tablet 1 tablet  Every 6 Hours PRN         01/29/25 1911 01/29/25 1911  sodium chloride 0.9 % flush 10 mL  As Needed         01/29/25 1911 01/29/25 1911  sodium chloride 0.9 % infusion 40 mL  As Needed   "       01/29/25 1911 01/29/25 1911  nitroglycerin (NITROSTAT) SL tablet 0.4 mg  Every 5 Minutes PRN         01/29/25 1911 01/29/25 1911  nitroglycerin (NITROSTAT) SL tablet 0.4 mg  Every 5 Minutes PRN,   Status:  Discontinued         01/29/25 1911 01/29/25 1905  POC Glucose Once  PROCEDURE ONCE        Comments: Complete no more than 45 minutes prior to patient eating      01/29/25 1858    01/29/25 1644  Inpatient Admission  Once         01/29/25 1645    01/29/25 1644  Code Status and Medical Interventions: CPR (Attempt to Resuscitate); Full Support  Continuous         01/29/25 1645    01/29/25 1620  iopamidol (ISOVUE-370) 76 % injection 100 mL  Once in Imaging         01/29/25 1604    01/29/25 1418  High Sensitivity Troponin T 1Hr  PROCEDURE ONCE         01/29/25 1354    01/29/25 1412  furosemide (LASIX) injection 80 mg  Once         01/29/25 1356    01/29/25 1403  Hepatitis Panel, Acute  Once         01/29/25 1402    01/29/25 1403  HIV-1 / O / 2 Ag / Antibody  Once         01/29/25 1402    01/29/25 1358  CT Angiogram Chest Pulmonary Embolism  1 Time Imaging         01/29/25 1357    01/29/25 1345  US Venous Doppler Lower Extremity Bilateral (duplex)  1 Time Imaging         01/29/25 1344    01/29/25 1343  Blood Culture - Blood, Arm, Left  Once         01/29/25 1344    01/29/25 1343  Blood Culture - Blood, Arm, Right  Once         01/29/25 1344    01/29/25 1343  Lactic Acid, Plasma  Once         01/29/25 1344    01/29/25 1343  Procalcitonin  Once         01/29/25 1344    01/29/25 1343  C-reactive Protein  Once         01/29/25 1344    01/29/25 1343  Sedimentation Rate  Once         01/29/25 1344    01/29/25 1333  Beta Strep Culture, Throat - Swab, Throat  Once         01/29/25 1332    01/29/25 1326  Scan Slide  Once,   Status:  Canceled         01/29/25 1326    01/29/25 1323  Blood Gas, Arterial With Co-Ox  PROCEDURE ONCE         01/29/25 1320    01/29/25 1308  Rapid Strep A Screen - Swab, Throat  STAT          "01/29/25 1307    01/29/25 1308  Protime-INR  Once         01/29/25 1307    01/29/25 1308  aPTT  Once         01/29/25 1307    01/29/25 1308  Urinalysis With Microscopic If Indicated (No Culture) - Urine, Clean Catch  Once         01/29/25 1307    01/29/25 1308  Blood Gas, Arterial -With Co-Ox Panel: Yes  Once         01/29/25 1307    01/29/25 1308  Magnesium  Once         01/29/25 1307    01/29/25 1308  TSH  Once         01/29/25 1307    01/29/25 1308  T4, Free  Once         01/29/25 1307    01/29/25 1308  Monitor Blood Pressure  Per Hospital Policy/Protocol         01/29/25 1307    01/29/25 1308  Continuous Pulse Oximetry  Continuous         01/29/25 1307    01/29/25 1308  Insert Peripheral IV  Once        Placed in \"And\" Linked Group    01/29/25 1307    01/29/25 1307  sodium chloride 0.9 % flush 10 mL  As Needed        Placed in \"And\" Linked Group    01/29/25 1307    01/29/25 1307  COVID-19 and FLU A/B PCR, 1 HR TAT - Swab, Nasopharynx  Once         01/29/25 1307    01/29/25 1259  XR Chest AP  1 Time Imaging         01/29/25 1248    01/29/25 1248  NPO Diet NPO Type: Strict NPO  Diet Effective Now,   Status:  Canceled         01/29/25 1248    01/29/25 1248  Undress & Gown  Once         01/29/25 1248    01/29/25 1248  Cardiac Monitoring  Continuous,   Status:  Canceled        Comments: Follow Standing Orders As Outlined in Process Instructions (Open Order Report to View Full Instructions)    01/29/25 1248    01/29/25 1248  Continuous Pulse Oximetry  Continuous,   Status:  Canceled         01/29/25 1248    01/29/25 1248  Vital Signs  Per Hospital Policy/Protocol         01/29/25 1248    01/29/25 1248  ECG 12 Lead ED Triage Standing Order; SOA  Once         01/29/25 1248    01/29/25 1248  XR Chest 2 View  1 Time Imaging,   Status:  Canceled         01/29/25 1248    01/29/25 1248  Insert Peripheral IV  Once         01/29/25 1248    01/29/25 1248  [place] Draw  Once         01/29/25 1248    01/29/25 1248  CBC & " Differential  Once         01/29/25 1248    01/29/25 1248  Comprehensive Metabolic Panel  Once         01/29/25 1248    01/29/25 1248  BNP  Once         01/29/25 1248    01/29/25 1248  High Sensitivity Troponin T  Once         01/29/25 1248    01/29/25 1248  Green Top (Gel)  PROCEDURE ONCE         01/29/25 1248    01/29/25 1248  Lavender Top  PROCEDURE ONCE         01/29/25 1248    01/29/25 1248  Gold Top - SST  PROCEDURE ONCE         01/29/25 1248    01/29/25 1248  Light Blue Top  PROCEDURE ONCE         01/29/25 1248    01/29/25 1248  CBC Auto Differential  PROCEDURE ONCE         01/29/25 1248    01/29/25 1247  sodium chloride 0.9 % flush 10 mL  As Needed         01/29/25 1248    01/29/25 0000  Telemetry Scan  Once         01/29/25 0000    01/29/25 0000  Telemetry Scan  Once         01/29/25 0000    01/06/25 0000  meloxicam (MOBIC) 15 MG tablet  Daily PRN         01/29/25 1759 12/05/24 0000  sildenafil (VIAGRA) 100 MG tablet  Daily PRN         01/29/25 1759    10/15/24 0000  cephalexin (KEFLEX) 500 MG capsule  3 Times Daily         01/29/25 1759    10/08/24 0000  mirtazapine (REMERON) 45 MG tablet  Daily         01/29/25 1759    10/08/24 0000  rosuvastatin (CRESTOR) 20 MG tablet  Nightly         01/29/25 1759    10/08/24 0000  famotidine (PEPCID) 40 MG tablet  Daily         01/29/25 1759    10/08/24 0000  gabapentin (NEURONTIN) 600 MG tablet  3 Times Daily         01/29/25 1759    Unscheduled  Oxygen Therapy- Nasal Cannula; Titrate 1-6 LPM Per SpO2; 90 - 95%  Continuous PRN       01/29/25 1248    Unscheduled  Up With Assistance  As Needed       01/29/25 1911    Unscheduled  Oxygen Therapy- Nasal Cannula; Titrate 1-6 LPM Per SpO2; 90 - 95%  Continuous PRN       01/29/25 1911    --  budesonide-formoterol (SYMBICORT) 160-4.5 MCG/ACT inhaler  2 Times Daily PRN         01/29/25 1751    --  doxycycline (VIBRAMYICN) 100 MG tablet  2 Times Daily         01/29/25 1759    --  albuterol sulfate  (90 Base) MCG/ACT  inhaler  Every 6 Hours PRN         01/29/25 1759    --  cyanocobalamin 1000 MCG/ML injection  Every 30 Days         01/29/25 1759    --  tamsulosin (FLOMAX) 0.4 MG capsule 24 hr capsule  Daily         01/29/25 1759    --  clonazePAM (KlonoPIN) 0.5 MG tablet  2 Times Daily PRN         01/29/25 1759    --  ferrous sulfate 325 (65 FE) MG tablet  Every Other Day         01/29/25 1801    --  acetaminophen (TYLENOL) 500 MG tablet  Every 6 Hours PRN         01/29/25 1801    Pending  albuterol (PROVENTIL) nebulizer solution 0.083% 2.5 mg/3mL  Every 6 Hours PRN         Pending    Pending  budesonide-formoterol (SYMBICORT) 160-4.5 MCG/ACT inhaler 2 puff  2 Times Daily - RT         Pending    Pending  clonazePAM (KlonoPIN) tablet 0.25 mg  2 Times Daily PRN         Pending    Pending  cyanocobalamin injection 1,000 mcg  Every 30 Days         Pending    Pending  famotidine (PEPCID) tablet 40 mg  Daily         Pending    Pending  tamsulosin (FLOMAX) 24 hr capsule 0.4 mg  Daily         Pending    Pending  rosuvastatin (CRESTOR) tablet 20 mg  Nightly         Pending    Pending  mirtazapine (REMERON) tablet 45 mg  Daily         Pending    Pending  meloxicam (MOBIC) tablet 15 mg  Daily PRN         Pending    Pending  gabapentin (NEURONTIN) capsule 600 mg  Every 8 Hours Scheduled         Pending    Pending  ferrous sulfate tablet 325 mg  Every Other Day         Pending                  Physician Progress Notes (all)    No notes of this type exist for this encounter.       Consult Notes (all)    No notes of this type exist for this encounter.

## 2025-01-30 NOTE — CONSULTS
Adult Nutrition  Assessment/PES    Patient Name:  Nj Urias  YOB: 1960  MRN: [id number]  Admit Date:  1/29/2025    Assessment Date:  1/30/2025    Comments:  consult MST    Decreased po reported on admit    Encourage po and voice food prefs    Will add Boost BID with meals to supplement po    Will cont to follow and monitor.      Reason for Assessment       Row Name 01/30/25 1309          Reason for Assessment    Reason For Assessment identified at risk by screening criteria  [facility] [place]     Diagnosis cardiac disease  acute on CHF, Thrush, COPD     Identified At Risk by Screening Criteria MST SCORE 2+                    Nutrition/Diet History       Row Name 01/30/25 1310          Nutrition/Diet History    Typical Intake (Food/Fluid/EN/PN) Called to room, no answer                    Labs/Tests/Procedures/Meds       Row Name 01/30/25 1310          Labs/Procedures/Meds    Lab Results Reviewed reviewed     Lab Results Comments WNL        Diagnostic Tests/Procedures    Diagnostic Test/Procedure Reviewed reviewed        Medications    Pertinent Medications Reviewed reviewed     Pertinent Medications Comments remeron, lasix, iron, B12                      Estimated/Assessed Needs - Anthropometrics       Row Name 01/30/25 1312          Anthropometrics    Calculation Weight 84 kg (185 lb 3 oz)        Estimated/Assessed Needs    Additional Documentation Estimated Calorie Needs (Group);Fluid Requirements (Group);Protein Requirements (Group)        Estimated Calorie Needs    Estimated Calorie Require (kcal/day) 2016-2382 kcal ( mifflin 1.2-1.4)     Estimated Calorie Need Method [place]-St Jeor        Protein Requirements    Est Protein Requirement Amount (gms/kg) 1.0 gm protein  84 gm pro        Fluid Requirements    Estimated Fluid Requirement Method RDA Method  2016-2382 ml                    Nutrition Prescription Ordered       Row Name 01/30/25 1312          Nutrition Prescription PO    Common  Modifiers Cardiac                    Evaluation of Received Nutrient/Fluid Intake       Row Name 01/30/25 1312          Fluid Intake Evaluation    Oral Fluid (mL) 360        PO Evaluation    Number of Days PO Intake Evaluated Insufficient Data                       Problem/Interventions:   Problem 1       Row Name 01/30/25 1313          Nutrition Diagnoses Problem 1    Problem 1 Other (comment)  Inadequate energy intake related to Acute on CHF as evidenced by reduced appetite on admit                          Intervention Goal       Row Name 01/30/25 1313          Intervention Goal    General Meet nutritional needs for age/condition     PO Establish PO;PO intake (%)     PO Intake % 50 %                    Nutrition Intervention       Row Name 01/30/25 1314          Nutrition Intervention    RD/Tech Action Encourage intake;Follow Tx progress;Recommend/ordered     Recommended/Ordered Supplement                    Nutrition Prescription       Row Name 01/30/25 1314          Nutrition Prescription PO    PO Prescription Begin/change supplement     Supplement Boost     Supplement Frequency 2 times a day                    Education/Evaluation       Row Name 01/30/25 1314          Education    Education Education not appropriate at this time        Monitor/Evaluation    Monitor Per protocol;I&O;PO intake;Supplement intake;Pertinent labs;Weight                     Electronically signed by:  Janine Foreman RD  01/30/25 13:14 EST

## 2025-01-30 NOTE — PROGRESS NOTES
"    [facility] Hospital[facility] PROGRESS NOTE    S: Patient seen and examined. Complains of malaise.  No CP.  No acute events overnight    O: /73 (BP Location: Right arm, Patient Position: Lying)   Pulse 64   Temp 98.2 °F (36.8 °C) (Oral)   Resp 20   Ht 185.4 cm (73\")   Wt 84 kg (185 lb 3.2 oz) Comment: admit weight, patient not on floor 24 hrs  SpO2 94%   BMI 24.43 kg/m²     GENERAL:  age appropriate, NAD  EARS,NOSE, MOUTH, THROAT:  MMM, good dentition  EYES: PERRLA, EOMI  CV:  NL S1/S2  RESPIRATORY:  CTAB no w/c/r  GI:  S/NT/ND +BS  SKIN: No rash or lesions. No nodules.  INT: No edema. No joint deformity.  PSYCH:  Normal affect, A+O x 3  NEURO: Alert and oriented, grossly nonfocal.    Scheduled Meds:furosemide, 80 mg, Intravenous, BID  heparin (porcine), 5,000 Units, Subcutaneous, Q8H  ipratropium-albuterol, 3 mL, Nebulization, 4x Daily - RT  lisinopril, 10 mg, Oral, Daily  nystatin, 5 mL, Oral, 4x Daily  sodium chloride, 10 mL, Intravenous, Q12H      Continuous Infusions:   PRN Meds:.  acetaminophen **OR** acetaminophen **OR** acetaminophen    senna-docusate sodium **AND** polyethylene glycol **AND** bisacodyl **AND** bisacodyl    Calcium Replacement - Follow Nurse / BPA Driven Protocol    HYDROcodone-acetaminophen    Magnesium Standard Dose Replacement - Follow Nurse / BPA Driven Protocol    nitroglycerin    Phosphorus Replacement - Follow Nurse / BPA Driven Protocol    Potassium Replacement - Follow Nurse / BPA Driven Protocol    sodium chloride    [COMPLETED] Insert Peripheral IV **AND** sodium chloride    sodium chloride    sodium chloride    Labs: Laboratory information reviewed and reconciled.    Results from last 7 days   Lab Units 01/30/25  0022   WBC 10*3/mm3 12.62*         Results from last 7 days   Lab Units 01/30/25  0022   CO2 mmol/L 30.0*   BUN mg/dL 15   CREATININE mg/dL 0.83   GLUCOSE mg/dL 93         Results from last 7 days   Lab Units 01/29/25  1318   INR  0.95 "         Imaging (last 24 hours):    CT Angiogram Chest Pulmonary Embolism    Result Date: 1/29/2025  No evidence of pulmonary embolus or dissection.     This report was finalized on 1/29/2025 4:10 PM by Napoleon Coronel M.D..      US Venous Doppler Lower Extremity Bilateral (duplex)    Result Date: 1/29/2025  No evidence of deep venous thrombosis.    This report was finalized on 1/29/2025 3:15 PM by Napoleon Coronel M.D..      XR Chest AP    Result Date: 1/29/2025  No acute cardiopulmonary process.   This report was finalized on 1/29/2025 1:51 PM by Napoleon Coronel M.D..      Assessment/plan:  # Acute CHF unknown EF  - newly diagnosed  - await TTE  - decrease lasix to 40 bid  - supplemental O2 as needed    # Thrush  - nystatin    # Hx COPD   - continue home regimen    # HTN  - continue home regimen  - monitor and titrate as needed        DVT Prophylaxis:heparin  Disposition:continue to monitor  Discharge disposition: home when stable  Prognosis: guarded    Alejandro Rojas Jr, MD  [facility] Hospitalist  01/30/25  08:37 EST

## 2025-01-30 NOTE — PLAN OF CARE
Goal Outcome Evaluation:        Pt currently resting in bed. No s/s of acute distress noted at this time. No complaints verbalized at this time. Plan of care ongoing.     Daily Care Plan Summary: Heart Failure    Diuretic in use (IV or PO):   IV         Output > Intake (yes/no):Yes      O2 Requirements (current, any change?): room air      Symptoms noted with Activity (Respiratory Tolerance, functional state):     no s/s of acute distress noted with ambulation       Anticipated Discharge Plans:     home with medically stable.

## 2025-01-30 NOTE — PLAN OF CARE
Goal Outcome Evaluation:   Daily Care Plan Summary: Heart Failure    Diuretic in use (IV or PO):   IV        Daily weight (up or down):    New admit     Output > Intake (yes/no):Yes      O2 Requirements (current, any change?): room air      Symptoms noted with Activity (Respiratory Tolerance, functional state):     Shortness of breath with activity       Anticipated Discharge Plans:     Home at D/C

## 2025-01-30 NOTE — THERAPY EVALUATION
Acute Care - Physical Therapy Initial Evaluation   [place]     Patient Name: Nj Urias  : 1960  MRN: [id number]  Today's Date: 2025      Visit Dx:     ICD-10-CM ICD-9-CM   1. Acute congestive heart failure, unspecified heart failure type  I50.9 428.0   2. Thrush  B37.0 112.0   3. Acute respiratory failure with hypoxia  J96.01 518.81     Patient Active Problem List   Diagnosis    CHF (congestive heart failure)     Past Medical History:   Diagnosis Date    COPD (chronic obstructive pulmonary disease)     Hypertension      History reviewed. No pertinent surgical history.  PT Assessment (Last 12 Hours)       PT Evaluation and Treatment       Row Name 25 1519          Physical Therapy Time and Intention    Subjective Information no complaints  -KM     Document Type evaluation  -KM     Mode of Treatment individual therapy;physical therapy  -KM     Patient Effort good  -KM     Symptoms Noted During/After Treatment none  -KM       Row Name 25 1519          General Information    Patient Profile Reviewed yes  -KM     Patient Observations alert;cooperative;agree to therapy  -KM     Prior Level of Function independent:;all household mobility;ADL's  -KM     Existing Precautions/Restrictions no known precautions/restrictions  -KM     Risks Reviewed patient:;LOB;nausea/vomiting;dizziness;increased discomfort  -KM     Benefits Reviewed patient:;improve function;increase independence;increase strength;increase balance  -KM     Barriers to Rehab none identified  -KM       Row Name 25 1519          Living Environment    Current Living Arrangements home  -KM     People in Home alone  -KM     Primary Care Provided by self  -KM       Row Name 25 1519          Home Use of Assistive/Adaptive Equipment    Equipment Currently Used at Home none  -KM       Row Name 25 1519          Cognition    Affect/Mental Status (Cognition) WFL  -KM     Orientation Status (Cognition) oriented x 4  -KM      Follows Commands (Cognition) WFL  -       Row Name 01/30/25 1519          Range of Motion (ROM)    Range of Motion bilateral lower extremities;ROM is [facility]  -       Row Name 01/30/25 1519          Strength (Manual Muscle Testing)    Strength (Manual Muscle Testing) bilateral lower extremities;strength is [facility]  -       Row Name 01/30/25 1519          Bed Mobility    Bed Mobility bed mobility (all) activities  -KM     All Activities, [place] (Bed Mobility) independent  -KM       Row Name 01/30/25 1519          Transfers    Transfers sit-stand transfer;stand-sit transfer  -KM       Row Name 01/30/25 1519          Sit-Stand Transfer    Sit-Stand [place] (Transfers) independent  -KM       Row Name 01/30/25 1519          Stand-Sit Transfer    Stand-Sit [place] (Transfers) independent  -KM       Row Name 01/30/25 1519          Gait/Stairs (Locomotion)    Gait/Stairs Locomotion gait/ambulation independence;distance ambulated  -KM     [place] Level (Gait) independent  -KM     Patient was able to Ambulate yes  -KM     Distance in Feet (Gait) 250  -KM     Pattern (Gait) step-through  -KM       Row Name 01/30/25 1519          Balance    Balance Assessment sitting static balance;standing dynamic balance  -KM     Static Sitting Balance independent  -KM     Position, Sitting Balance sitting edge of bed  -KM     Dynamic Standing Balance independent  -KM       Row Name 01/30/25 1519          Plan of Care Review    Plan of Care Reviewed With patient  -     Outcome Evaluation Pt. evaluation completed during PT session. He was able to perform functional mobility skills independently. He was able to ambulate increased distances w/ no AD. He tolerated session well. Pt. does not require skilled PT services.  -       Row Name 01/30/25 1519          Therapy Assessment/Plan (PT)    Functional Level at Time of Evaluation (PT) independent  -KM     Criteria for Skilled Interventions Met (PT) no;does not meet criteria  for skilled intervention  -     Therapy Frequency (PT) evaluation only  -       Row Name 01/30/25 1519          Therapy Plan Review/Discharge Plan (PT)    Therapy Plan Review (PT) evaluation/treatment results reviewed;patient;spouse/significant other  -               User Key  (r) = Recorded By, (t) = Taken By, (c) = Cosigned By      Initials Name Provider Type    Braulio Giron, PT Physical Therapist                    Physical Therapy Education       Title: PT OT SLP Therapies (Done)       Topic: Physical Therapy (Done)       Point: Mobility training (Done)       Learning Progress Summary            Patient Acceptance, E,TB, VU by  at 1/30/2025 1529                      Point: Home exercise program (Done)       Learning Progress Summary            Patient Acceptance, E,TB, VU by  at 1/30/2025 1529                      Point: Body mechanics (Done)       Learning Progress Summary            Patient Acceptance, E,TB, VU by  at 1/30/2025 1529                      Point: Precautions (Done)       Learning Progress Summary            Patient Acceptance, E,TB, VU by  at 1/30/2025 1529                                      User Key       Initials Effective Dates Name Provider Type [facility] 05/24/22 -  Braulio Nguyen, PT Physical Therapist PT                  PT Recommendation and Plan  Anticipated Discharge Disposition (PT): home  Therapy Frequency (PT): evaluation only  Plan of Care Reviewed With: patient  Outcome Evaluation: Pt. evaluation completed during PT session. He was able to perform functional mobility skills independently. He was able to ambulate increased distances w/ no AD. He tolerated session well. Pt. does not require skilled PT services.       Time Calculation:    PT Charges       Row Name 01/30/25 1517             Time Calculation    PT Received On 01/30/25  -                User Key  (r) = Recorded By, (t) = Taken By, (c) = Cosigned By      Initials Name Provider Type      Braulio Nguyen, PT Physical Therapist                  Therapy Charges for Today       Code Description Service Date Service Provider Modifiers Qty    44109501173 HC PT EVAL LOW COMPLEXITY 4 1/30/2025 Braulio Nguyen, PT GP 1            PT G-Codes  AM-PAC 6 Clicks Score (PT): 23    Braulio Nguyen, PT  1/30/2025

## 2025-01-31 LAB
ANION GAP SERPL CALCULATED.3IONS-SCNC: 10.3 MMOL/L (ref 5–15)
BACTERIA SPEC AEROBE CULT: NORMAL
BASOPHILS # BLD AUTO: 0.07 10*3/MM3 (ref 0–0.2)
BASOPHILS NFR BLD AUTO: 0.7 % (ref 0–1.5)
BUN SERPL-MCNC: 28 MG/DL (ref 8–23)
BUN/CREAT SERPL: 21.1 (ref 7–25)
CALCIUM SPEC-SCNC: 8.7 MG/DL (ref 8.6–10.5)
CHLORIDE SERPL-SCNC: 102 MMOL/L (ref 98–107)
CO2 SERPL-SCNC: 27.7 MMOL/L (ref 22–29)
CREAT SERPL-MCNC: 1.33 MG/DL (ref 0.76–1.27)
DEPRECATED RDW RBC AUTO: 57.1 FL (ref 37–54)
EGFRCR SERPLBLD CKD-EPI 2021: 59.3 ML/MIN/1.73
EOSINOPHIL # BLD AUTO: 0.27 10*3/MM3 (ref 0–0.4)
EOSINOPHIL NFR BLD AUTO: 2.8 % (ref 0.3–6.2)
ERYTHROCYTE [DISTWIDTH] IN BLOOD BY AUTOMATED COUNT: 16.7 % (ref 12.3–15.4)
GEN 5 1HR TROPONIN T REFLEX: 26 NG/L
GLUCOSE SERPL-MCNC: 134 MG/DL (ref 65–99)
HCT VFR BLD AUTO: 33.1 % (ref 37.5–51)
HGB BLD-MCNC: 10.5 G/DL (ref 13–17.7)
IMM GRANULOCYTES # BLD AUTO: 0.04 10*3/MM3 (ref 0–0.05)
IMM GRANULOCYTES NFR BLD AUTO: 0.4 % (ref 0–0.5)
LYMPHOCYTES # BLD AUTO: 2.47 10*3/MM3 (ref 0.7–3.1)
LYMPHOCYTES NFR BLD AUTO: 25.7 % (ref 19.6–45.3)
MCH RBC QN AUTO: 29.9 PG (ref 26.6–33)
MCHC RBC AUTO-ENTMCNC: 31.7 G/DL (ref 31.5–35.7)
MCV RBC AUTO: 94.3 FL (ref 79–97)
MONOCYTES # BLD AUTO: 0.84 10*3/MM3 (ref 0.1–0.9)
MONOCYTES NFR BLD AUTO: 8.7 % (ref 5–12)
NEUTROPHILS NFR BLD AUTO: 5.92 10*3/MM3 (ref 1.7–7)
NEUTROPHILS NFR BLD AUTO: 61.7 % (ref 42.7–76)
NRBC BLD AUTO-RTO: 0 /100 WBC (ref 0–0.2)
PLATELET # BLD AUTO: 278 10*3/MM3 (ref 140–450)
PMV BLD AUTO: 10.4 FL (ref 6–12)
POTASSIUM SERPL-SCNC: 3.7 MMOL/L (ref 3.5–5.2)
POTASSIUM SERPL-SCNC: 4.6 MMOL/L (ref 3.5–5.2)
QT INTERVAL: 384 MS
QTC INTERVAL: 442 MS
RBC # BLD AUTO: 3.51 10*6/MM3 (ref 4.14–5.8)
SODIUM SERPL-SCNC: 140 MMOL/L (ref 136–145)
TROPONIN T % DELTA: 8
TROPONIN T NUMERIC DELTA: 2 NG/L
TROPONIN T SERPL HS-MCNC: 24 NG/L
WBC NRBC COR # BLD AUTO: 9.61 10*3/MM3 (ref 3.4–10.8)

## 2025-01-31 PROCEDURE — 94664 DEMO&/EVAL PT USE INHALER: CPT

## 2025-01-31 PROCEDURE — 94799 UNLISTED PULMONARY SVC/PX: CPT

## 2025-01-31 PROCEDURE — 84484 ASSAY OF TROPONIN QUANT: CPT

## 2025-01-31 PROCEDURE — 93010 ELECTROCARDIOGRAM REPORT: CPT | Performed by: INTERNAL MEDICINE

## 2025-01-31 PROCEDURE — 99222 1ST HOSP IP/OBS MODERATE 55: CPT | Performed by: INTERNAL MEDICINE

## 2025-01-31 PROCEDURE — 80048 BASIC METABOLIC PNL TOTAL CA: CPT | Performed by: INTERNAL MEDICINE

## 2025-01-31 PROCEDURE — 99232 SBSQ HOSP IP/OBS MODERATE 35: CPT | Performed by: INTERNAL MEDICINE

## 2025-01-31 PROCEDURE — 94761 N-INVAS EAR/PLS OXIMETRY MLT: CPT

## 2025-01-31 PROCEDURE — 25010000002 HEPARIN (PORCINE) PER 1000 UNITS: Performed by: INTERNAL MEDICINE

## 2025-01-31 PROCEDURE — 84132 ASSAY OF SERUM POTASSIUM: CPT | Performed by: INTERNAL MEDICINE

## 2025-01-31 PROCEDURE — 85025 COMPLETE CBC W/AUTO DIFF WBC: CPT | Performed by: INTERNAL MEDICINE

## 2025-01-31 PROCEDURE — 93005 ELECTROCARDIOGRAM TRACING: CPT

## 2025-01-31 RX ORDER — FUROSEMIDE 20 MG/1
20 TABLET ORAL DAILY
Status: DISCONTINUED | OUTPATIENT
Start: 2025-02-01 | End: 2025-02-01 | Stop reason: HOSPADM

## 2025-01-31 RX ORDER — POTASSIUM CHLORIDE 1500 MG/1
20 TABLET, EXTENDED RELEASE ORAL ONCE
Status: COMPLETED | OUTPATIENT
Start: 2025-01-31 | End: 2025-01-31

## 2025-01-31 RX ADMIN — HEPARIN SODIUM 5000 UNITS: 5000 INJECTION INTRAVENOUS; SUBCUTANEOUS at 13:13

## 2025-01-31 RX ADMIN — BUDESONIDE AND FORMOTEROL FUMARATE DIHYDRATE 2 PUFF: 160; 4.5 AEROSOL RESPIRATORY (INHALATION) at 18:45

## 2025-01-31 RX ADMIN — CLONAZEPAM 0.25 MG: 0.5 TABLET ORAL at 08:03

## 2025-01-31 RX ADMIN — NYSTATIN 500000 UNITS: 100000 SUSPENSION ORAL at 21:16

## 2025-01-31 RX ADMIN — FAMOTIDINE 40 MG: 20 TABLET, FILM COATED ORAL at 08:03

## 2025-01-31 RX ADMIN — IPRATROPIUM BROMIDE AND ALBUTEROL SULFATE 3 ML: 2.5; .5 SOLUTION RESPIRATORY (INHALATION) at 13:59

## 2025-01-31 RX ADMIN — IPRATROPIUM BROMIDE AND ALBUTEROL SULFATE 3 ML: 2.5; .5 SOLUTION RESPIRATORY (INHALATION) at 00:29

## 2025-01-31 RX ADMIN — IPRATROPIUM BROMIDE AND ALBUTEROL SULFATE 3 ML: 2.5; .5 SOLUTION RESPIRATORY (INHALATION) at 06:58

## 2025-01-31 RX ADMIN — HEPARIN SODIUM 5000 UNITS: 5000 INJECTION INTRAVENOUS; SUBCUTANEOUS at 21:16

## 2025-01-31 RX ADMIN — HEPARIN SODIUM 5000 UNITS: 5000 INJECTION INTRAVENOUS; SUBCUTANEOUS at 06:18

## 2025-01-31 RX ADMIN — MIRTAZAPINE 45 MG: 15 TABLET, FILM COATED ORAL at 08:03

## 2025-01-31 RX ADMIN — POTASSIUM CHLORIDE 20 MEQ: 1500 TABLET, EXTENDED RELEASE ORAL at 01:45

## 2025-01-31 RX ADMIN — NYSTATIN 500000 UNITS: 100000 SUSPENSION ORAL at 08:03

## 2025-01-31 RX ADMIN — HYDROCODONE BITARTRATE AND ACETAMINOPHEN 1 TABLET: 10; 325 TABLET ORAL at 08:03

## 2025-01-31 RX ADMIN — ROSUVASTATIN 20 MG: 20 TABLET, FILM COATED ORAL at 21:15

## 2025-01-31 RX ADMIN — BUDESONIDE AND FORMOTEROL FUMARATE DIHYDRATE 2 PUFF: 160; 4.5 AEROSOL RESPIRATORY (INHALATION) at 06:58

## 2025-01-31 RX ADMIN — POTASSIUM CHLORIDE 20 MEQ: 1500 TABLET, EXTENDED RELEASE ORAL at 08:46

## 2025-01-31 RX ADMIN — HYDROCODONE BITARTRATE AND ACETAMINOPHEN 1 TABLET: 10; 325 TABLET ORAL at 14:47

## 2025-01-31 RX ADMIN — IPRATROPIUM BROMIDE AND ALBUTEROL SULFATE 3 ML: 2.5; .5 SOLUTION RESPIRATORY (INHALATION) at 18:45

## 2025-01-31 RX ADMIN — HYDROCODONE BITARTRATE AND ACETAMINOPHEN 1 TABLET: 10; 325 TABLET ORAL at 21:15

## 2025-01-31 RX ADMIN — NYSTATIN 500000 UNITS: 100000 SUSPENSION ORAL at 17:26

## 2025-01-31 RX ADMIN — Medication 10 ML: at 08:03

## 2025-01-31 RX ADMIN — CLONAZEPAM 0.25 MG: 0.5 TABLET ORAL at 21:15

## 2025-01-31 RX ADMIN — NYSTATIN 500000 UNITS: 100000 SUSPENSION ORAL at 11:07

## 2025-01-31 NOTE — CASE MANAGEMENT/SOCIAL WORK
Continued Stay Note  GIOVANNI Umaña     Patient Name: Nj Urias  MRN: [id number]  Today's Date: 1/31/2025    Admit Date: 1/29/2025       Discharge Plan       Row Name 01/31/25 1108       Plan    Plan CM spoke with pt at the bedside. Pt lives at home alone and plans to return. Pt has straight cane and nebulizer via unknown provider. Pt does not have HH and denies any needs. Pt's s/o will transport at d/c. CM will follow.    Patient/Family in Agreement with Plan yes                  Sudha Cisse RN

## 2025-01-31 NOTE — PROGRESS NOTES
[facility] HOSPITALIST PROGRESS NOTE     Patient Identification:  Name:  Nj Urias  Age:  64 y.o.  Sex:  male  :  1960  MRN:  [id number]  Visit Number:  [id number]  ROOM: [address] [place] [address]     Primary Care Provider:  Reynaldo Rodriguez MD    Length of stay in inpatient status:  2    Subjective     Chief Compliant:    Chief Complaint   Patient presents with    Shortness of Breath    Leg Swelling     History of Presenting Illness:    Patient remains ill but stable today, no acute events overnight, no new complaints, denies any fevers or chills, feeling some better, was hypotensive yesterday, creatinine up today, consulted Cardiology and appreciate recommendations, continue to monitor today.   Objective     Current Hospital Meds:  budesonide-formoterol, 2 puff, Inhalation, BID - RT  [START ON 2025] cyanocobalamin, 1,000 mcg, Intramuscular, Q30 Days  famotidine, 40 mg, Oral, Daily  ferrous sulfate, 325 mg, Oral, Every Other Day  [Held by provider] furosemide, 40 mg, Intravenous, BID  [Held by provider] gabapentin, 600 mg, Oral, Q8H  heparin (porcine), 5,000 Units, Subcutaneous, Q8H  ipratropium-albuterol, 3 mL, Nebulization, 4x Daily - RT  [Held by provider] lisinopril, 10 mg, Oral, Daily  mirtazapine, 45 mg, Oral, Daily  nystatin, 5 mL, Oral, 4x Daily  rosuvastatin, 20 mg, Oral, Nightly  sodium chloride, 10 mL, Intravenous, Q12H  [Held by provider] tamsulosin, 0.4 mg, Oral, Daily         ----------------------------------------------------------------------------------------------------------------------  Vital Signs:  Temp:  [97.7 °F (36.5 °C)-98.3 °F (36.8 °C)] 97.7 °F (36.5 °C)  Heart Rate:  [] 78  Resp:  [18-20] 20  BP: ()/(40-69) 109/69  SpO2:  [89 %-96 %] 90 %  on   ;   Device (Oxygen Therapy): room air  Body mass index is 24.93 kg/m².      Intake/Output Summary (Last 24 hours) at 2025 1012  Last data filed at 2025 0837  Gross per 24 hour   Intake 600 ml   Output --  "  Net 600 ml      ----------------------------------------------------------------------------------------------------------------------  Physical exam:  Constitutional:  Elderly.  Mild acute distress.      HENT:  Head:  Normocephalic and atraumatic.  Mouth:  Moist mucous membranes.    Eyes:  Conjunctivae and EOM are normal. No scleral icterus.    Neck:  Neck supple.  No JVD present.    Cardiovascular:  Normal rate, regular rhythm and normal heart sounds with no murmur.  Pulmonary/Chest:  No respiratory distress, basilar crackles, on NC  Abdominal:  Soft. No distension and no tenderness.   Musculoskeletal:  No tenderness, and no deformity.  No red or swollen joints anywhere.    Neurological:  Alert and oriented to person, place, and time.  No cranial nerve deficit.    Skin:  Skin is warm and dry. No rash noted. No pallor.   Peripheral vascular:  No clubbing, no cyanosis, no significant edema.  Psychiatric: Appropriate mood and affect    Edited by: Mart Murillo MD at 1/31/2025 1010  ----------------------------------------------------------------------------------------------------------------------  WBC/HGB/HCT/PLT   9.61/10.5/33.1/278 (01/31 0033)  BUN/CREAT/GLUC/ALT/AST/LAMBERT/LIP    28/1.33/134/--/--/--/-- (01/31 0033)  ALISON - Na/K/Cl/CO2: 140/3.7/102/27.7 (01/31 0033)        No results found for: \"URINECX\"  Blood Culture   Date Value Ref Range Status   01/29/2025 No growth at 24 hours  Preliminary   01/29/2025 No growth at 24 hours  Preliminary       I have personally looked at the labs and they are summarized above.  ----------------------------------------------------------------------------------------------------------------------  Detailed radiology reports for the last 24 hours:  CT Angiogram Chest Pulmonary Embolism    Result Date: 1/29/2025  No evidence of pulmonary embolus or dissection.     This report was finalized on 1/29/2025 4:10 PM by Napoleon Coronel M.D..      US Venous Doppler Lower Extremity " Bilateral (duplex)    Result Date: 1/29/2025  No evidence of deep venous thrombosis.    This report was finalized on 1/29/2025 3:15 PM by Napoleon Coronel M.D..      XR Chest AP    Result Date: 1/29/2025  No acute cardiopulmonary process.   This report was finalized on 1/29/2025 1:51 PM by Napoleon Coronel M.D..     Assessment & Plan    #Acute HFpEF  - Cardiology consulted; Recommendations pending  - Attempt goal directed medical therapy as able   - Continue to monitor on telemetry, strict I/O's, trend heart rate and blood pressure    #Nonoliguric Acute Kidney Injury, likely due to hypotension   - Hold nephrotoxins, Trend creatinine and urine output, avoid nephrotoxins, NSAIDs, dehydration and contrast as able.     #COPD/Emphysema without Acute Exacerbation  - Continue home regimen, Add Duonebs as needed   - Will consider COPD rescue kit at time of discharge    #Thrush  - Continue Nystatin     F: Oral  E: Monitor & Replace as needed   N: Diet: Cardiac; Healthy Heart (2-3 Na+); Fluid Consistency: Thin (IDDSI 0)  PPx: SQH  Code Status (Patient has no pulse and is not breathing): CPR   Medical Interventions (Patient has pulse or is breathing): Full Support     Dispo: Pending workup and clinical improvement    *This patient is considered high risk secondary to Congestive Heart Failure, Acute Kidney Injury.   Edited by: Mart Murillo MD at 1/31/2025 1012  [facility] Hospitalist

## 2025-01-31 NOTE — PLAN OF CARE
Goal Outcome Evaluation:                  Patient has been resting this shift. No s/s of acute distress noted at this time. Patient has no complaints currently. Plan of care ongoing.                           Daily Care Plan Summary: Heart Failure    Diuretic in use (IV or PO):   IV       Output > Intake (yes/no):Yes      O2 Requirements (current, any change?): room air-2L @ night      Symptoms noted with Activity (Respiratory Tolerance, functional state):      no s/s of acute distress noted with ambulation       Anticipated Discharge Plans:     Home when stable

## 2025-01-31 NOTE — CONSULTS
[facility]  CONSULT  NOTE      Patient information:  Date of Admit: 1/29/2025  Date of Consult: 01/31/25  Hospitalist/Referring MD:Alejandro Rojas Jr., MD;   PCP: Reynaldo Rodriguez MD  MRN:  [id number]  Visit Number:  [id number]    LOS: 2  CODE STATUS:  Code Status and Medical Interventions: CPR (Attempt to Resuscitate); Full Support   Ordered at: 01/29/25 1645     Code Status (Patient has no pulse and is not breathing):    CPR (Attempt to Resuscitate)     Medical Interventions (Patient has pulse or is breathing):    Full Support       PROBLEM LIST: Active Problems:    * No active hospital problems. *      Inpatient Cardiology Consult  Consult performed by: Iwona Jones APRN  Consult ordered by: Mart Murillo MD        09:09 EST  1/31/2025    Reason for Cardiology consultation: CHF     General Cardiology Consulting Physician: Dr. Epifanio MD    Primary Cardiologist: None found on chart review.    Assessment       #Acute HFpEF   #Nonoliguric Acute Kidney Injury, likely due to hypotension   #COPD/Emphysema without Acute Exacerbation   # NSTEMI likely type II in setting of heart failure exacerbation      Planning     -Echocardiogram showing normal EF.   -Will start Lasix 20 mg p.o. once daily from tomorrow  -Will likely benefit from Jardiance.  Can be initiated as outpatient.  Will recommend outpatient follow-up for ischemic evaluation  -Encouraged about smoking cessation counseling  -Will follow at distance.  Please reconsult as needed.       Subjective Data     1/31/2025    ADMISSION INFORMATION:  Chief Complaint   Patient presents with    Shortness of Breath    Leg Swelling     History of Present Illness (HPI)  HPI obtained from chart review   Nj Urias is a 64 y.o. male with a past medical history significant for hypertension, HLD, COPD, current tobacco and chronic pain syndrome.     Patient presented to [facility] ([facility]) emergency department  (ED) on 1/29/2025 with complaints of shortness of breath and peripheral edema started yesterday and has became worse today.  Patient reports his shortness of breath is aggravated with lying flat and activity.  Patient denied any chest pain but did report some pain in his left arm earlier in the day.  Patient reports he does not wear oxygen at home but does use inhalers for COPD.  Patient reports that he does currently smoke at least 1 pack/day.  Patient also endorses that he has a rash in his mouth that feels like it goes all the way down his throat that started yesterday which makes his voice still hoarse.  Patient does report using steroid inhalers but denies any steroid pills recently.    While in the ED, respiratory swabs were negative, glucose 86, creatinine 0.78, potassium 4.4, proBNP 1805.0, HS troponin 20-> 22, WBC 17.67, hemoglobin 9.9, and platelet count 287.  Magnesium 1.8, TSH 0.627, free T4 was 1.01, procalcitonin was 0.07, CRP was 4.05, sed rate was 28, hepatitis panel was negative, HIV was negative.  Single view chest x-ray revealed no acute cardiopulmonary processes.ECG demonstrates normal sinus rhythm at 82 bpm. MT and QTc interval and normalizes QRS duration. There are no acute ST-T wave changes.  Ultrasound venous Doppler BLE with no evidence of DVT noted.  Patient was diagnosed with congestive heart failure, thrush, and acute respiratory failure with hypoxia.    Cardiology has been consulted for further evaluation and management for CHF.     Primary Cardiologist: None found on chart review.     Patient was in room 325 when he was seen and examined by Dr. Godfrey.  Patient sitting up in chair at bedside.  No acute distress noted at this time.  Patient reports he is feeling a lot better.  Patient reports history of smoking 1 pack/day x 45 years and hypertension.  Patient denies having any edema prior to admission or a history of frequent UTIs.     EVENT TIMELINE:  1/31: TTE results pending     Known  medications given enroute via EMS and in the ER:   Medications   sodium chloride 0.9 % flush 10 mL (has no administration in time range)   sodium chloride 0.9 % flush 10 mL (has no administration in time range)   lisinopril (PRINIVIL,ZESTRIL) tablet 10 mg ( Oral Dose Auto Held 2/15/25 0900)   sodium chloride 0.9 % flush 10 mL (10 mL Intravenous Given 1/31/25 0803)   sodium chloride 0.9 % flush 10 mL (has no administration in time range)   sodium chloride 0.9 % infusion 40 mL (has no administration in time range)   nitroglycerin (NITROSTAT) SL tablet 0.4 mg (has no administration in time range)   heparin (porcine) 5000 UNIT/ML injection 5,000 Units (5,000 Units Subcutaneous Given 1/31/25 0618)   Potassium Replacement - Follow Nurse / BPA Driven Protocol (has no administration in time range)   Magnesium Standard Dose Replacement - Follow Nurse / BPA Driven Protocol (has no administration in time range)   Phosphorus Replacement - Follow Nurse / BPA Driven Protocol (has no administration in time range)   Calcium Replacement - Follow Nurse / BPA Driven Protocol (has no administration in time range)   acetaminophen (TYLENOL) tablet 650 mg (has no administration in time range)     Or   acetaminophen (TYLENOL) 160 MG/5ML oral solution 650 mg (has no administration in time range)     Or   acetaminophen (TYLENOL) suppository 650 mg (has no administration in time range)   sennosides-docusate (PERICOLACE) 8.6-50 MG per tablet 2 tablet (has no administration in time range)     And   polyethylene glycol (MIRALAX) packet 17 g (has no administration in time range)     And   bisacodyl (DULCOLAX) EC tablet 5 mg (has no administration in time range)     And   bisacodyl (DULCOLAX) suppository 10 mg (has no administration in time range)   ipratropium-albuterol (DUO-NEB) nebulizer solution 3 mL (3 mL Nebulization Given 1/31/25 0658)   nystatin (MYCOSTATIN) 100,000 unit/mL suspension 500,000 Units (500,000 Units Oral Given 1/31/25 1107)    albuterol (PROVENTIL) nebulizer solution 0.083% 2.5 mg/3mL (has no administration in time range)   budesonide-formoterol (SYMBICORT) 160-4.5 MCG/ACT inhaler 2 puff (2 puffs Inhalation Given 1/31/25 0658)   clonazePAM (KlonoPIN) tablet 0.25 mg (0.25 mg Oral Given 1/31/25 0803)   cyanocobalamin injection 1,000 mcg (has no administration in time range)   famotidine (PEPCID) tablet 40 mg (40 mg Oral Given 1/31/25 0803)   tamsulosin (FLOMAX) 24 hr capsule 0.4 mg ( Oral Dose Auto Held 2/15/25 0900)   rosuvastatin (CRESTOR) tablet 20 mg (20 mg Oral Given 1/30/25 2125)   mirtazapine (REMERON) tablet 45 mg (45 mg Oral Given 1/31/25 0803)   gabapentin (NEURONTIN) capsule 600 mg ( Oral Dose Auto Held 2/15/25 2200)   ferrous sulfate tablet 325 mg (325 mg Oral Given 1/30/25 0905)   furosemide (LASIX) injection 40 mg ( Intravenous Dose Auto Held 2/15/25 2100)   HYDROcodone-acetaminophen (NORCO)  MG per tablet 1 tablet (1 tablet Oral Given 1/31/25 0803)   furosemide (LASIX) injection 80 mg (80 mg Intravenous Given 1/29/25 1405)   iopamidol (ISOVUE-370) 76 % injection 100 mL (70 mL Intravenous Given 1/29/25 1604)   hydrOXYzine (ATARAX) tablet 25 mg (25 mg Oral Given 1/29/25 2340)   potassium chloride (KLOR-CON M20) CR tablet 20 mEq (20 mEq Oral Given 1/30/25 0546)   potassium chloride (KLOR-CON M20) CR tablet 20 mEq (20 mEq Oral Given 1/31/25 0145)   potassium chloride (KLOR-CON M20) CR tablet 20 mEq (20 mEq Oral Given 1/31/25 0846)     Personal History     Cardiac risk factors:hypercholesterolemia, hypertension, and smoking      Last Echo: Results for orders placed during the hospital encounter of 01/29/25    Adult Transthoracic Echo Complete With Contrast if Necessary Per Protocol    Interpretation Summary    Normal left ventricular cavity size and wall thickness noted. All left ventricular wall segments contract normally.    Left ventricular ejection fraction appears to be 66 - 70%.    The aortic valve is not well  visualized. No aortic valve regurgitation or stenosis is present. The aortic valve is grossly normal in structure.    The mitral valve is structurally normal with no regurgitation or significant stenosis present.    There is no evidence of pericardial effusion. .       Last Stress: No results found for this or any previous visit.      Last Cath:  No results found for this or any previous visit.               EP study: No results found for this or any previous visit.              Past Medical History:   Diagnosis Date    COPD (chronic obstructive pulmonary disease)     Hypertension      History reviewed. No pertinent surgical history.  History reviewed. No pertinent family history.  Social History     Tobacco Use    Smoking status: Every Day     Current packs/day: 2.00     Types: Cigarettes   Vaping Use    Vaping status: Never Used   Substance Use Topics    Alcohol use: No    Drug use: No     ALLERGIES: Codeine and Demerol [meperidine]    Medications listed below are reported home medications pulling from within the system:  Medications Prior to Admission   Medication Sig Dispense Refill Last Dose/Taking    acetaminophen (TYLENOL) 500 MG tablet Take 1 tablet by mouth Every 6 (Six) Hours As Needed for Mild Pain.   Past Week    albuterol sulfate  (90 Base) MCG/ACT inhaler Inhale 2 puffs Every 6 (Six) Hours As Needed for Wheezing or Shortness of Air.   1/29/2025    budesonide-formoterol (SYMBICORT) 160-4.5 MCG/ACT inhaler Inhale 2 puffs 2 (Two) Times a Day As Needed.   Past Week    cephalexin (KEFLEX) 500 MG capsule Take 1 capsule by mouth 3 (Three) Times a Day.   1/29/2025    clonazePAM (KlonoPIN) 0.5 MG tablet Take 0.5 tablets by mouth 2 (Two) Times a Day As Needed for Anxiety.   1/29/2025    doxycycline (VIBRAMYICN) 100 MG tablet Take 1 tablet by mouth 2 (Two) Times a Day.   1/29/2025    famotidine (PEPCID) 40 MG tablet Take 1 tablet by mouth Daily.   1/29/2025    ferrous sulfate 325 (65 FE) MG tablet Take 1  tablet by mouth Every Other Day.   Past Week    gabapentin (NEURONTIN) 600 MG tablet Take 1 tablet by mouth 3 (Three) Times a Day.   1/29/2025    HYDROcodone-acetaminophen (NORCO) 5-325 MG per tablet Take 1 tablet by mouth Every 6 (Six) Hours As Needed for Severe Pain. 10 tablet 0 Past Week    lisinopril (PRINIVIL,ZESTRIL) 10 MG tablet Take 1 tablet by mouth Daily.   1/29/2025    meloxicam (MOBIC) 15 MG tablet Take 1 tablet by mouth Daily As Needed for Mild Pain.   Past Week    mirtazapine (REMERON) 45 MG tablet Take 1 tablet by mouth Daily.   1/29/2025    rosuvastatin (CRESTOR) 20 MG tablet Take 1 tablet by mouth Every Night.   1/28/2025    sildenafil (VIAGRA) 100 MG tablet Take 1 tablet by mouth Daily As Needed for Erectile Dysfunction.   Past Month    tamsulosin (FLOMAX) 0.4 MG capsule 24 hr capsule Take 1 capsule by mouth Daily.   1/29/2025    cyanocobalamin 1000 MCG/ML injection Inject 1 mL into the appropriate muscle as directed by prescriber Every 30 (Thirty) Days.   1/6/2025     ROS obtained from ER note. Patient denied any edema or shortness of breath when questioned at bedside today.     Review of Systems   Constitutional:  Negative for activity change, diaphoresis and unexpected weight change.   HENT:  Negative for facial swelling and trouble swallowing.    Eyes:  Negative for visual disturbance.   Respiratory:  Positive for shortness of breath. Negative for apnea, cough, chest tightness, wheezing and stridor.    Cardiovascular:  Positive for leg swelling. Negative for chest pain and palpitations.   Gastrointestinal:  Negative for abdominal distention, nausea and vomiting.   Endocrine: Negative.    Genitourinary: Negative.    Musculoskeletal:         Left arm pain   Skin:  Negative for color change.   Neurological:  Negative for dizziness, syncope, speech difficulty and weakness.   Psychiatric/Behavioral:  Negative for agitation and behavioral problems.      Objective Data   Vital Signs  Temp:  [97.7 °F  (36.5 °C)-98.3 °F (36.8 °C)] 97.9 °F (36.6 °C)  Heart Rate:  [] 70  Resp:  [16-20] 20  BP: ()/(40-69) 111/69  Device (Oxygen Therapy): room air  Vital Signs (last 72 hrs)         01/28 0700  01/29 0659 01/29 0700 01/30 0659 01/30 0700 01/31 0659 01/31 0700 01/31 0909   Most Recent      Temp (°F)   98.2 -  99.1    97.7 -  98.5      97.7     97.7 (36.5) 01/31 0710    Heart Rate   60 -  90    63 -  100      78     78 01/31 0710    Resp   17 -  20    18 -  20      20     20 01/31 0710    BP   105/62 -  159/92    82/56 -  142/82      109/69     109/69 01/31 0710    SpO2 (%)   91 -  98    89 -  96      90     90 01/31 0710    Flow (L/min) (Oxygen Therapy)   1 -  2         2 01/30 0020          BMI:   Body mass index is 24.93 kg/m².  WEIGHT:  Wt Readings from Last 3 Encounters:   01/31/25 85.7 kg (188 lb 15 oz)   01/12/25 79.4 kg (175 lb)   08/18/18 83.9 kg (185 lb)     DIET:  Diet: Cardiac; Healthy Heart (2-3 Na+); Fluid Consistency: Thin (IDDSI 0)  I&O:  Intake & Output (last 3 days)         01/28 0701 01/29 0700 01/29 0701 01/30 0700 01/30 0701 01/31 0700 01/31 0701 02/01 0700    P.O.  360 480 360    Total Intake(mL/kg)  360 (4.3) 480 (5.6) 360 (4.2)    Net  +360 +480 +360            Urine Unmeasured Occurrence  5 x 6 x     Stool Unmeasured Occurrence   1 x           Physical Exam  Constitutional:       Appearance: Normal appearance. He is not ill-appearing or diaphoretic.   HENT:      Head: Normocephalic and atraumatic.      Nose: Nose normal.      Mouth/Throat:      Mouth: Mucous membranes are moist.   Eyes:      Extraocular Movements: Extraocular movements intact.      Pupils: Pupils are equal, round, and reactive to light.   Cardiovascular:      Rate and Rhythm: Normal rate and regular rhythm.      Heart sounds: Normal heart sounds.   Pulmonary:      Effort: Pulmonary effort is normal.      Breath sounds: Normal breath sounds.   Abdominal:      General: Bowel sounds are normal.      Palpations:  "Abdomen is soft.   Musculoskeletal:         General: Normal range of motion.      Cervical back: Normal range of motion.   Skin:     General: Skin is warm and dry.   Neurological:      General: No focal deficit present.      Mental Status: He is alert and oriented to person, place, and time. Mental status is at baseline.   Psychiatric:         Mood and Affect: Mood normal.         Behavior: Behavior normal.         Thought Content: Thought content normal.         Judgment: Judgment normal.       Results review   Results Review:    I have reviewed the patient's new clinical results. 01/31/25 13:07 EST    Results from last 7 days   Lab Units 01/31/25  0154 01/31/25  0055 01/29/25  1412 01/29/25  1318   HSTROP T ng/L 26* 24* 22* 20     Lab Results   Component Value Date    PROBNP 1,805.0 (H) 01/29/2025     Results from last 7 days   Lab Units 01/31/25  0033 01/30/25  0022 01/29/25  1318   WBC 10*3/mm3 9.61 12.62* 17.67*   HEMOGLOBIN g/dL 10.5* 9.9* 9.9*   PLATELETS 10*3/mm3 278 294 287     Results from last 7 days   Lab Units 01/31/25  0033 01/30/25  0837 01/30/25  0022 01/29/25  1318   SODIUM mmol/L 140  --  143 141   POTASSIUM mmol/L 3.7 4.0 3.8 4.4   CHLORIDE mmol/L 102  --  104 108*   CO2 mmol/L 27.7  --  30.0* 24.1   BUN mg/dL 28*  --  15 16   CREATININE mg/dL 1.33*  --  0.83 0.78   CALCIUM mg/dL 8.7  --  8.9 8.8   GLUCOSE mg/dL 134*  --  93 86   ALT (SGPT) U/L  --   --  10 12   AST (SGOT) U/L  --   --  15 19     Lab Results   Component Value Date    MG 1.8 01/29/2025     No results found for: \"CHOL\", \"TRIG\", \"HDL\", \"LDL\"  Estimated Creatinine Clearance: 67.1 mL/min (A) (by C-G formula based on SCr of 1.33 mg/dL (H)).  No results found for: \"HGBA1C\"  Lab Results   Component Value Date    INR 0.95 01/29/2025     No results found for: \"LABHEPA\"  No components found for: \"DIG\"  Lab Results   Component Value Date    TSH 0.627 01/29/2025      No results found for: \"URICACID\"  Pain Management Panel           No data to " display              Microbiology Results (last 10 days)       Procedure Component Value - Date/Time    Blood Culture - Blood, Arm, Right [[id number]]  (Normal) Collected: 25 1412    Lab Status: Preliminary result Specimen: Blood from Arm, Right Updated: 25 1415     Blood Culture No growth at 24 hours    Blood Culture - Blood, Arm, Left [[id number]]  (Normal) Collected: 25 1354    Lab Status: Preliminary result Specimen: Blood from Arm, Left Updated: 25 1415     Blood Culture No growth at 24 hours    COVID-19 and FLU A/B PCR, 1 HR TAT - Swab, Nasopharynx [[id number]]  (Normal) Collected: 25 1318    Lab Status: Final result Specimen: Swab from Nasopharynx Updated: 25 1349     COVID19 Not Detected     Influenza A PCR Not Detected     Influenza B PCR Not Detected    Narrative:      Fact sheet for providers: https://www.fda.gov/media/744891/download    Fact sheet for patients: https://www.fda.gov/media/681647/download    Test performed by PCR.    Rapid Strep A Screen - Swab, Throat [[id number]]  (Normal) Collected: 25 1318    Lab Status: Final result Specimen: Swab from Throat Updated: 25 1332     Strep A Ag Negative    Beta Strep Culture, Throat - Swab, Throat [[id number]]  (Normal) Collected: 25 1318    Lab Status: Final result Specimen: Swab from Throat Updated: 25 0959     Throat Culture, Beta Strep No Beta Hemolytic Streptococcus Isolated    Narrative:      Group A Strep incidence is low in adults. Positive culture for Beta hemolytic Streptococcus species can reflect colonization and not true infection. Please correlate clinically.           Blood Culture   Date Value Ref Range Status   2025 No growth at 24 hours  Preliminary       EC2025        ECG/EMG Results (last 24 hours)       Procedure Component Value Units Date/Time    ECG 12 Lead ED Triage Standing Order; SOA [[id number]] Collected: 25 1248     Updated: 25 1146      QT Interval 368 ms      QTC Interval 429 ms     Narrative:      Test Reason : ED Triage Standing Order~  Blood Pressure :   */*   mmHG  Vent. Rate :  82 BPM     Atrial Rate :  82 BPM     P-R Int : 116 ms          QRS Dur :  90 ms      QT Int : 368 ms       P-R-T Axes :  39  17  68 degrees    QTcB Int : 429 ms    Normal sinus rhythm  Normal ECG  When compared with ECG of 18-Aug-2018 14:04,  Criteria for Septal infarct are no longer present  Confirmed by Martin Hewitt ([id number]) on 1/30/2025 11:46:02 AM    Referred By: SUSANNA           Confirmed By: Martin Hewitt    Adult Transthoracic Echo Complete With Contrast if Necessary Per Protocol [[id number]] Resulted: 01/30/25 1422     Updated: 01/30/25 1422     EF(MOD-bp) 68.5 %      LVIDd 4.5 cm      LVIDs 3.5 cm      IVSd 1.10 cm      LVPWd 1.15 cm      FS 23.3 %      IVS/LVPW 0.96 cm      ESV(cubed) 41.1 ml      LV Sys Vol (BSA corrected) 11.7 cm2      EDV(cubed) 91.1 ml      LV Anna Vol (BSA corrected) 44.2 cm2      LV mass(C)d 180.7 grams      LVOT area 4.5 cm2      LVOT diam 2.40 cm      EDV(MOD-sp2) 108.0 ml      EDV(MOD-sp4) 92.1 ml      ESV(MOD-sp2) 41.5 ml      ESV(MOD-sp4) 24.3 ml      SV(MOD-sp2) 66.5 ml      SV(MOD-sp4) 67.8 ml      SVi(MOD-SP2) 31.9 ml/m2      SVi(MOD-SP4) 32.6 ml/m2      EF(MOD-sp2) 61.6 %      EF(MOD-sp4) 73.6 %      MV E max bandar 57.4 cm/sec      MV A max bandar 68.6 cm/sec      MV E/A 0.84     LA ESV Index (BP) 19.8 ml/m2      Med Peak E' Bandar 6.4 cm/sec      Lat Peak E' Bandar 9.0 cm/sec      TR max bandar 288.0 cm/sec      Avg E/e' ratio 7.45     TAPSE (>1.6) 2.9 cm      LA dimension (2D)  3.8 cm      Ao pk bandar 145.0 cm/sec      Ao max PG 8.4 mmHg      Ao mean PG 5.0 mmHg      Ao V2 VTI 26.5 cm      TR max PG 33.2 mmHg      RVSP(TR) 43.2 mmHg      RAP systole 10.0 mmHg      PA acc time 0.11 sec      Ao root diam 3.5 cm      ACS 2.10 cm     Narrative:        Normal left ventricular cavity size and wall thickness noted. All left   ventricular  wall segments contract normally.    Left ventricular ejection fraction appears to be 66 - 70%.    The aortic valve is not well visualized. No aortic valve regurgitation   or stenosis is present. The aortic valve is grossly normal in structure.    The mitral valve is structurally normal with no regurgitation or   significant stenosis present.    There is no evidence of pericardial effusion. .      Telemetry Scan [[id number]] Resulted: 01/29/25     Updated: 01/30/25 1910    Telemetry Scan [[id number]] Resulted: 01/29/25     Updated: 01/30/25 1910    ECG 12 Lead Chest Pain [[id number]] Collected: 01/31/25 0058     Updated: 01/31/25 0059     QT Interval 384 ms      QTC Interval 442 ms     Narrative:      Test Reason : Chest Pain  Blood Pressure :   */*   mmHG  Vent. Rate :  80 BPM     Atrial Rate :  80 BPM     P-R Int : 124 ms          QRS Dur :  92 ms      QT Int : 384 ms       P-R-T Axes :  49  -5  66 degrees    QTcB Int : 442 ms    Normal sinus rhythm  Normal ECG  When compared with ECG of 29-Jan-2025 12:48,  No significant change was found    Referred By:            Confirmed By:     Telemetry Scan [[id number]] Resulted: 01/29/25     Updated: 01/31/25 0621            TELEMETRY:         RADIOLOGY STUDIES:  Imaging Results (Last 72 Hours)       Procedure Component Value Units Date/Time    CT Angiogram Chest Pulmonary Embolism [[id number]] Collected: 01/29/25 1609     Updated: 01/29/25 1612    Narrative:      PROCEDURE: CT ANGIOGRAM CHEST PULMONARY EMBOLISM-     HISTORY: hypoxia     COMPARISON: 8/18/2018.     TECHNIQUE: Multiple axial CT images were obtained from the thoracic  inlet through the upper abdomen following the administration of Isovue  300 per the CT PE protocol. Coronal and oblique 3D MIP images were  reconstructed from the original axial data set. This study was performed  with techniques to keep radiation doses as low as reasonably achievable  (ALARA). Individualized dose reduction techniques using  automated  exposure control or adjustment of mA and/or kV according to the patient  size were employed.     FINDINGS: There are no filling defects within the pulmonary arteries to  suggest an embolus. The thoracic aorta is normal in caliber with no  evidence of dissection. The heart is normal in size. There are no  pleural or pericardial effusions. There is no adenopathy. The thyroid  gland is unremarkable. Lung windows reveal centrilobular emphysema.  There are no focal opacities or suspicious pulmonary nodules. Note is  made of a cyst in the superior pole of the right kidney. Bone windows  reveal no acute osseous abnormalities.       Impression:      No evidence of pulmonary embolus or dissection.              This report was finalized on 1/29/2025 4:10 PM by Napoleon Coronel M.D..       US Venous Doppler Lower Extremity Bilateral (duplex) [[id number]] Collected: 01/29/25 1515     Updated: 01/29/25 1517    Narrative:      PROCEDURE: US VENOUS DOPPLER LOWER EXTREMITY BILATERAL (DUPLEX)-     HISTORY: LE edema     PROCEDURE: Multiple transverse and longitudinal scans were performed of  both femoropopliteal deep venous system, with augmentation and  compression maneuvers.     COMPARISON: None.     FINDINGS: Normal phasic flow was noted in the visualized deep venous  system. No intraluminal increased echogenicity is noted to suggest  thrombus. There is normal compression and augmentation of the venous  structures.  No abnormal venous collaterals are seen.       Impression:      No evidence of deep venous thrombosis.           This report was finalized on 1/29/2025 3:15 PM by Napoleon Coronel M.D..       XR Chest AP [[id number]] Collected: 01/29/25 1351     Updated: 01/29/25 1353    Narrative:      PROCEDURE: XR CHEST AP-       HISTORY: SOA Triage Protocol     COMPARISON: None.     FINDINGS: The heart is normal in size. The mediastinum is unremarkable.  The lungs are clear. There is no pneumothorax. There are no  acute  osseous abnormalities.       Impression:      No acute cardiopulmonary process.        This report was finalized on 1/29/2025 1:51 PM by Napoleon Coronel M.D..               ALLERGIES: Codeine and Demerol [meperidine]    CURRENT MEDICATIONS:  Current list of medications may not reflect those currently placed in orders that are not signed or are being held.     budesonide-formoterol, 2 puff, Inhalation, BID - RT  [START ON 2/5/2025] cyanocobalamin, 1,000 mcg, Intramuscular, Q30 Days  famotidine, 40 mg, Oral, Daily  ferrous sulfate, 325 mg, Oral, Every Other Day  [Held by provider] furosemide, 40 mg, Intravenous, BID  [Held by provider] gabapentin, 600 mg, Oral, Q8H  heparin (porcine), 5,000 Units, Subcutaneous, Q8H  ipratropium-albuterol, 3 mL, Nebulization, 4x Daily - RT  [Held by provider] lisinopril, 10 mg, Oral, Daily  mirtazapine, 45 mg, Oral, Daily  nystatin, 5 mL, Oral, 4x Daily  rosuvastatin, 20 mg, Oral, Nightly  sodium chloride, 10 mL, Intravenous, Q12H  [Held by provider] tamsulosin, 0.4 mg, Oral, Daily           acetaminophen **OR** acetaminophen **OR** acetaminophen    albuterol    senna-docusate sodium **AND** polyethylene glycol **AND** bisacodyl **AND** bisacodyl    Calcium Replacement - Follow Nurse / BPA Driven Protocol    clonazePAM    HYDROcodone-acetaminophen    Magnesium Standard Dose Replacement - Follow Nurse / BPA Driven Protocol    nitroglycerin    Phosphorus Replacement - Follow Nurse / BPA Driven Protocol    Potassium Replacement - Follow Nurse / BPA Driven Protocol    sodium chloride    [COMPLETED] Insert Peripheral IV **AND** sodium chloride    sodium chloride    sodium chloride        Thank you very much for asking us to be involved in this patient's care. Please do not hesitate to call for any questions or concerns.     I have discussed the patients findings and recommendations with the patient.    Electronically signed by IRAJ Jain, 01/31/25, 1:07 PM EST.                        Please note that portions of this note were copied and has been reviewed and is accurate as of 1/31/2025 .      Please note that portions of this note were completed with a voice recognition program.

## 2025-01-31 NOTE — PLAN OF CARE
Goal Outcome Evaluation:                   Pt currently resting in bed. No s/s of acute distress noted at this time. No complaints verbalized at this time. Plan of care ongoing.       Daily Care Plan Summary: Heart Failure    Diuretic in use (IV or PO):   PO            Output > Intake (yes/no):Yes      O2 Requirements (current, any change?): room air      Symptoms noted with Activity (Respiratory Tolerance, functional state):     pt able to ambulate without difficulty       Anticipated Discharge Plans:     home when medically stable

## 2025-02-01 ENCOUNTER — READMISSION MANAGEMENT (OUTPATIENT)
Dept: CALL CENTER | Facility: HOSPITAL | Age: 65
End: 2025-02-01
Payer: MEDICARE

## 2025-02-01 VITALS
OXYGEN SATURATION: 94 % | HEIGHT: 73 IN | WEIGHT: 189.38 LBS | RESPIRATION RATE: 20 BRPM | HEART RATE: 72 BPM | TEMPERATURE: 98.1 F | DIASTOLIC BLOOD PRESSURE: 63 MMHG | BODY MASS INDEX: 25.1 KG/M2 | SYSTOLIC BLOOD PRESSURE: 115 MMHG

## 2025-02-01 LAB
ANION GAP SERPL CALCULATED.3IONS-SCNC: 6.8 MMOL/L (ref 5–15)
BASOPHILS # BLD AUTO: 0.07 10*3/MM3 (ref 0–0.2)
BASOPHILS NFR BLD AUTO: 0.8 % (ref 0–1.5)
BUN SERPL-MCNC: 24 MG/DL (ref 8–23)
BUN/CREAT SERPL: 26.4 (ref 7–25)
CALCIUM SPEC-SCNC: 8.9 MG/DL (ref 8.6–10.5)
CHLORIDE SERPL-SCNC: 104 MMOL/L (ref 98–107)
CO2 SERPL-SCNC: 25.2 MMOL/L (ref 22–29)
CREAT SERPL-MCNC: 0.91 MG/DL (ref 0.76–1.27)
DEPRECATED RDW RBC AUTO: 57.8 FL (ref 37–54)
EGFRCR SERPLBLD CKD-EPI 2021: 93.5 ML/MIN/1.73
EOSINOPHIL # BLD AUTO: 0.33 10*3/MM3 (ref 0–0.4)
EOSINOPHIL NFR BLD AUTO: 3.9 % (ref 0.3–6.2)
ERYTHROCYTE [DISTWIDTH] IN BLOOD BY AUTOMATED COUNT: 16.4 % (ref 12.3–15.4)
GLUCOSE SERPL-MCNC: 127 MG/DL (ref 65–99)
HCT VFR BLD AUTO: 34 % (ref 37.5–51)
HGB BLD-MCNC: 10.4 G/DL (ref 13–17.7)
IMM GRANULOCYTES # BLD AUTO: 0.02 10*3/MM3 (ref 0–0.05)
IMM GRANULOCYTES NFR BLD AUTO: 0.2 % (ref 0–0.5)
LYMPHOCYTES # BLD AUTO: 2.12 10*3/MM3 (ref 0.7–3.1)
LYMPHOCYTES NFR BLD AUTO: 24.8 % (ref 19.6–45.3)
MCH RBC QN AUTO: 29.1 PG (ref 26.6–33)
MCHC RBC AUTO-ENTMCNC: 30.6 G/DL (ref 31.5–35.7)
MCV RBC AUTO: 95.2 FL (ref 79–97)
MONOCYTES # BLD AUTO: 0.69 10*3/MM3 (ref 0.1–0.9)
MONOCYTES NFR BLD AUTO: 8.1 % (ref 5–12)
NEUTROPHILS NFR BLD AUTO: 5.33 10*3/MM3 (ref 1.7–7)
NEUTROPHILS NFR BLD AUTO: 62.2 % (ref 42.7–76)
NRBC BLD AUTO-RTO: 0 /100 WBC (ref 0–0.2)
PLATELET # BLD AUTO: 281 10*3/MM3 (ref 140–450)
PMV BLD AUTO: 10.6 FL (ref 6–12)
POTASSIUM SERPL-SCNC: 4.4 MMOL/L (ref 3.5–5.2)
RBC # BLD AUTO: 3.57 10*6/MM3 (ref 4.14–5.8)
SODIUM SERPL-SCNC: 136 MMOL/L (ref 136–145)
WBC NRBC COR # BLD AUTO: 8.56 10*3/MM3 (ref 3.4–10.8)

## 2025-02-01 PROCEDURE — 85025 COMPLETE CBC W/AUTO DIFF WBC: CPT | Performed by: INTERNAL MEDICINE

## 2025-02-01 PROCEDURE — 94664 DEMO&/EVAL PT USE INHALER: CPT

## 2025-02-01 PROCEDURE — 25010000002 HEPARIN (PORCINE) PER 1000 UNITS: Performed by: INTERNAL MEDICINE

## 2025-02-01 PROCEDURE — 94799 UNLISTED PULMONARY SVC/PX: CPT

## 2025-02-01 PROCEDURE — 94761 N-INVAS EAR/PLS OXIMETRY MLT: CPT

## 2025-02-01 PROCEDURE — 80048 BASIC METABOLIC PNL TOTAL CA: CPT | Performed by: INTERNAL MEDICINE

## 2025-02-01 PROCEDURE — 99239 HOSP IP/OBS DSCHRG MGMT >30: CPT | Performed by: STUDENT IN AN ORGANIZED HEALTH CARE EDUCATION/TRAINING PROGRAM

## 2025-02-01 RX ORDER — GABAPENTIN 600 MG/1
300 TABLET ORAL 3 TIMES DAILY
Start: 2025-02-01

## 2025-02-01 RX ORDER — GABAPENTIN 300 MG/1
300 CAPSULE ORAL EVERY 8 HOURS SCHEDULED
Status: DISCONTINUED | OUTPATIENT
Start: 2025-02-01 | End: 2025-02-01 | Stop reason: HOSPADM

## 2025-02-01 RX ORDER — FUROSEMIDE 20 MG/1
20 TABLET ORAL DAILY
Qty: 30 TABLET | Refills: 0 | Status: SHIPPED | OUTPATIENT
Start: 2025-02-01 | End: 2025-03-03

## 2025-02-01 RX ORDER — IPRATROPIUM BROMIDE AND ALBUTEROL SULFATE 2.5; .5 MG/3ML; MG/3ML
3 SOLUTION RESPIRATORY (INHALATION) EVERY 4 HOURS PRN
Status: DISCONTINUED | OUTPATIENT
Start: 2025-02-01 | End: 2025-02-01 | Stop reason: HOSPADM

## 2025-02-01 RX ADMIN — HEPARIN SODIUM 5000 UNITS: 5000 INJECTION INTRAVENOUS; SUBCUTANEOUS at 13:06

## 2025-02-01 RX ADMIN — CLONAZEPAM 0.25 MG: 0.5 TABLET ORAL at 08:01

## 2025-02-01 RX ADMIN — GABAPENTIN 300 MG: 300 CAPSULE ORAL at 13:06

## 2025-02-01 RX ADMIN — HEPARIN SODIUM 5000 UNITS: 5000 INJECTION INTRAVENOUS; SUBCUTANEOUS at 06:12

## 2025-02-01 RX ADMIN — MIRTAZAPINE 45 MG: 15 TABLET, FILM COATED ORAL at 08:01

## 2025-02-01 RX ADMIN — NYSTATIN 500000 UNITS: 100000 SUSPENSION ORAL at 08:02

## 2025-02-01 RX ADMIN — FAMOTIDINE 40 MG: 20 TABLET, FILM COATED ORAL at 08:01

## 2025-02-01 RX ADMIN — FERROUS SULFATE TAB 325 MG (65 MG ELEMENTAL FE) 325 MG: 325 (65 FE) TAB at 08:02

## 2025-02-01 RX ADMIN — Medication 10 ML: at 08:02

## 2025-02-01 RX ADMIN — BUDESONIDE AND FORMOTEROL FUMARATE DIHYDRATE 2 PUFF: 160; 4.5 AEROSOL RESPIRATORY (INHALATION) at 07:33

## 2025-02-01 RX ADMIN — HYDROCODONE BITARTRATE AND ACETAMINOPHEN 1 TABLET: 10; 325 TABLET ORAL at 08:01

## 2025-02-01 RX ADMIN — HYDROCODONE BITARTRATE AND ACETAMINOPHEN 1 TABLET: 10; 325 TABLET ORAL at 13:33

## 2025-02-01 RX ADMIN — TAMSULOSIN HYDROCHLORIDE 0.4 MG: 0.4 CAPSULE ORAL at 09:11

## 2025-02-01 RX ADMIN — NYSTATIN 500000 UNITS: 100000 SUSPENSION ORAL at 11:21

## 2025-02-01 RX ADMIN — FUROSEMIDE 20 MG: 20 TABLET ORAL at 08:01

## 2025-02-01 NOTE — DISCHARGE INSTR - APPOINTMENTS
Reynaldo Rodriguez's office closed on the weekend. Will call on Monday and get appointment and get a referral for Cardiology for an Ischemic out patient workup.

## 2025-02-01 NOTE — PLAN OF CARE
Goal Outcome Evaluation:                   Pt stable for discharge today. Pt to be taken home by family on RA.

## 2025-02-01 NOTE — DISCHARGE SUMMARY
[facility] HOSPITALISTS DISCHARGE SUMMARY    Patient Identification:  Name:  Nj Urias  Age:  64 y.o.  Sex:  male  :  1960  MRN:  [id number]  Visit Number:  [id number]    Date of Admission: 2025  Date of Discharge:  2025     PCP: Reynaldo Rodriguez MD    DISCHARGE DIAGNOSIS      CONSULTS       PROCEDURES PERFORMED      HOSPITAL COURSE  Patient is a 64 y.o. male presented to [facility] complaining of ***.  Please see the admitting history and physical for further details.      Presented  with SOB and BL LE edema.  Newly diagnosed CHF.  Awaiting TTE.  Remains on diuresis.  Possibly discharge later today or in AM  Addendum: Echocardiogram without any significant abnormalities.  I went to discharge patient but blood pressure was reading systolic in the 70s.  Manual was 88/40.  Patient asymptomatic but discussed with patient and he is in agreement to stay overnight.  I will hold BP lowering medications and monitor for now.    VITAL SIGNS:  Temp:  [97.8 °F (36.6 °C)-98.6 °F (37 °C)] 98.1 °F (36.7 °C)  Heart Rate:  [72-95] 72  Resp:  [16-20] 20  BP: (102-128)/(60-73) 115/63  SpO2:  [92 %-99 %] 94 %  on  Flow (L/min) (Oxygen Therapy):  [2] 2;   Device (Oxygen Therapy): room air    Body mass index is 24.99 kg/m².  Wt Readings from Last 3 Encounters:   25 85.9 kg (189 lb 6 oz)   25 79.4 kg (175 lb)   18 83.9 kg (185 lb)       PHYSICAL EXAM:  Constitutional:  Well-developed and well-nourished.  No respiratory distress.      HENT:  Head:  Normocephalic and atraumatic.  Mouth:  Moist mucous membranes.    Eyes:  Conjunctivae and EOM are normal.  No scleral icterus.    Neck:  Neck supple.  No JVD present.    Cardiovascular:  Normal rate, regular rhythm and normal heart sounds with no murmur.  Pulmonary/Chest:  No respiratory distress, no wheezes, no crackles, with normal breath sounds and good air movement.  Abdominal:  Soft.  Bowel sounds are normal.  No  distension and no tenderness.   Musculoskeletal:  No edema, no tenderness, and no deformity.  No red or swollen joints anywhere.    Neurological:  Alert and oriented to person, place, and time.  No cranial nerve deficit.  No tongue deviation.  No facial droop.  No slurred speech.   Skin:  Skin is warm and dry. No rash noted. No pallor.   Peripheral vascular: Pulses in all 4 extremities with no clubbing, no cyanosis, no edema.    DISCHARGE DISPOSITION   Stable    DISCHARGE MEDICATIONS:     Discharge Medications        New Medications        Instructions Start Date   furosemide 20 MG tablet  Commonly known as: Lasix   20 mg, Oral, Daily             Changes to Medications        Instructions Start Date   gabapentin 600 MG tablet  Commonly known as: NEURONTIN  What changed: how much to take   300 mg, Oral, 3 Times Daily             Continue These Medications        Instructions Start Date   acetaminophen 500 MG tablet  Commonly known as: TYLENOL   500 mg, Every 6 Hours PRN      albuterol sulfate  (90 Base) MCG/ACT inhaler  Commonly known as: PROVENTIL HFA;VENTOLIN HFA;PROAIR HFA   2 puffs, Every 6 Hours PRN      budesonide-formoterol 160-4.5 MCG/ACT inhaler  Commonly known as: SYMBICORT   2 puffs, Inhalation, 2 Times Daily PRN      cephalexin 500 MG capsule  Commonly known as: KEFLEX   500 mg, Oral, 3 Times Daily      clonazePAM 0.5 MG tablet  Commonly known as: KlonoPIN   0.25 mg, 2 Times Daily PRN      cyanocobalamin 1000 MCG/ML injection   1,000 mcg, Intramuscular, Every 30 Days      doxycycline 100 MG tablet  Commonly known as: VIBRAMYICN   100 mg, 2 Times Daily      famotidine 40 MG tablet  Commonly known as: PEPCID   40 mg, Oral, Daily      ferrous sulfate 325 (65 FE) MG tablet   325 mg, Every Other Day      HYDROcodone-acetaminophen 5-325 MG per tablet  Commonly known as: NORCO   1 tablet, Oral, Every 6 Hours PRN      mirtazapine 45 MG tablet  Commonly known as: REMERON   1 tablet, Oral, Daily       rosuvastatin 20 MG tablet  Commonly known as: CRESTOR   1 tablet, Oral, Nightly      sildenafil 100 MG tablet  Commonly known as: VIAGRA   100 mg, Oral, Daily PRN      tamsulosin 0.4 MG capsule 24 hr capsule  Commonly known as: FLOMAX   1 capsule, Daily             Stop These Medications      lisinopril 10 MG tablet  Commonly known as: PRINIVIL,ZESTRIL     meloxicam 15 MG tablet  Commonly known as: MOBIC                Additional Instructions for the Follow-ups that You Need to Schedule       Discharge Follow-up with PCP   As directed       Currently Documented PCP:    Reynaldo Rodriguez MD    PCP Phone Number:    [phone]     Follow Up Details: 1 week post hospital follow up        Discharge Follow-up with Specialty: Cardiology; 3 Weeks   As directed      Specialty: Cardiology   Follow Up: 3 Weeks   Follow Up Details: Outpatient ischemic work up, recent hospitalization for acute HFpEF               Follow-up Information       Reynaldo Rodriguez MD Follow up in 3 week(s).    Specialty: Family Medicine  Why: PATIENT WILL BE NOTIFIED ABOUT APPT ON FRI 1/31 AM  Contact information:  [address]  [facility] [address]  [facility] [address]  [phone]               Reynaldo Rodriguez MD .    Specialty: Family Medicine  Why: 1 week post hospital follow up  Contact information:  [address] [facility] [address]  [facility] [address]  [phone]                              TEST  RESULTS PENDING AT DISCHARGE  Pending Labs       Order Current Status    Blood Culture - Blood, Arm, Left Preliminary result    Blood Culture - Blood, Arm, Right Preliminary result             CODE STATUS  Code Status and Medical Interventions: CPR (Attempt to Resuscitate); Full Support   Ordered at: 01/29/25 1645     Code Status (Patient has no pulse and is not breathing):    CPR (Attempt to Resuscitate)     Medical Interventions (Patient has pulse or is breathing):    Full Support       Des Damian DO  [facility]  Hospitalist  02/01/25  11:59 EST    Please note that this discharge summary required more than 30 minutes to complete.

## 2025-02-01 NOTE — PLAN OF CARE
Goal Outcome Evaluation:                             Patient has been resting this shift. No s/s of acute distress noted at this time. Patient has no complaints currently. Plan of care ongoing.           Daily Care Plan Summary: Heart Failure    Diuretic in use (IV or PO):   IV        Daily weight (up or down):    gain: 1lbs      Output > Intake (yes/no):Yes      O2 Requirements (current, any change?): room air      Symptoms noted with Activity (Respiratory Tolerance, functional state):     No s/sx with ambulation      Anticipated Discharge Plans:     Home when medically stable

## 2025-02-02 NOTE — OUTREACH NOTE
Prep Survey      Flowsheet Row Responses   [facility] facility patient discharged from? [facility]   Is LACE score < 7 ? No   Eligibility Readm Mgmt   Discharge diagnosis Congestive heart failure,   Does the patient have one of the following disease processes/diagnoses(primary or secondary)? CHF   Does the patient have Home health ordered? No   Is there a DME ordered? No   Prep survey completed? Yes            ZELALEM TRONCOSO - Registered Nurse

## 2025-02-03 ENCOUNTER — TELEPHONE (OUTPATIENT)
Dept: TELEMETRY | Facility: HOSPITAL | Age: 65
End: 2025-02-03
Payer: MEDICARE

## 2025-02-03 LAB
BACTERIA SPEC AEROBE CULT: NORMAL
BACTERIA SPEC AEROBE CULT: NORMAL

## 2025-02-05 ENCOUNTER — READMISSION MANAGEMENT (OUTPATIENT)
Dept: CALL CENTER | Facility: HOSPITAL | Age: 65
End: 2025-02-05
Payer: MEDICARE

## 2025-02-05 NOTE — OUTREACH NOTE
CHF Week 1 Survey      Flowsheet Row Responses   [facility] [place] patient discharged from? [facility]   Does the patient have one of the following disease processes/diagnoses(primary or secondary)? CHF   CHF Week 1 attempt successful? Yes   Call start time 1205   Call end time 1210   Discharge diagnosis Congestive heart failure,   Meds reviewed with patient/caregiver? Yes   Is the patient having any side effects they believe may be caused by any medication additions or changes? No   Does the patient have all medications ordered at discharge? Yes   Is the patient taking all medications as directed (includes completed medication regime)? Yes   Does the patient have a primary care provider?  Yes   Does the patient have an appointment with their PCP within 7 days of discharge? Yes   Has the patient kept scheduled appointments due by today? Yes   Comments Pt reports that he has been referred to Pulmonology by his PCP.   DME comments Home nebulizers in place.   Pulse Ox monitoring None   Comments Pt reports that his SOA and LE edema has resolved. Pt reports that he monitors his wts daily at home, wt today 175#. Pt states that he is doing well.   Did the patient receive a copy of their discharge instructions? Yes   Nursing interventions Reviewed instructions with patient   What is the patient's perception of their health status since discharge? Improving   Nursing interventions Nurse provided patient education   Is the patient able to teach back signs and symptoms of worsening condition? (i.e. weight gain, shortness of air, etc.) Yes   If the patient is a current smoker, are they able to teach back resources for cessation? --  [Pt smokes 1/2pk per day, nursing educated pt on importance of smoking cessation]   Is the patient/caregiver able to teach back the hierarchy of who to call/visit for symptoms/problems? PCP, Specialist, Home health nurse, Urgent Care, ED, 911 Yes   CHF Zone this Call Green Zone   Green Zone Patient  reports doing well, Weight check stable   Green Zone Interventions Daily weight check, Meds as directed, Follow up visits planned    CHF Week 1 call completed? Yes   Call end time 1210            Shayy MACIAS - Registered Nurse

## 2025-02-14 ENCOUNTER — READMISSION MANAGEMENT (OUTPATIENT)
Dept: CALL CENTER | Facility: HOSPITAL | Age: 65
End: 2025-02-14
Payer: MEDICARE

## 2025-02-14 NOTE — OUTREACH NOTE
CHF Week 2 Survey      Flowsheet Row Responses   [facility] facility patient discharged from? [facility]   Does the patient have one of the following disease processes/diagnoses(primary or secondary)? CHF   Week 2 attempt successful? No   Unsuccessful attempts Attempt 1            LUCERO HUGO - Registered Nurse

## 2025-02-19 ENCOUNTER — READMISSION MANAGEMENT (OUTPATIENT)
Dept: CALL CENTER | Facility: HOSPITAL | Age: 65
End: 2025-02-19
Payer: MEDICARE

## 2025-02-19 NOTE — OUTREACH NOTE
CHF Week 2 Survey      Flowsheet Row Responses   [facility] patient discharged from? [facility]   Does the patient have one of the following disease processes/diagnoses(primary or secondary)? CHF   Week 2 attempt successful? Yes   Call start time 1321   Call end time 1325   Discharge diagnosis Congestive heart failure,   Is the patient taking all medications as directed (includes completed medication regime)? Yes   Does the patient have a primary care provider?  Yes   Psychosocial issues? No   What is the patient's perception of their health status since discharge? New symptoms unrelated to diagnosis   CHF Zone this Call Green Zone   Green Zone Patient reports doing well, No new or worsening shortness of breath, No new swelling -  feet, ankles and legs look normal for you   Green Zone Interventions Daily weight check, Follow up visits planned   CHF Week 2 call completed? Yes   Wrap up additional comments States he is having issues with back, leg and foot.  States it has been an ogoing problems.  Advised to speak with his PCP regarding possible referral to ortho.  States breathing has been good and weight has been stable. He is taking ibuprofen for back been and has help just a little bit.   Call end time 1325            Yanna TAN - Licensed Nurse

## 2025-05-23 ENCOUNTER — APPOINTMENT (OUTPATIENT)
Dept: GENERAL RADIOLOGY | Facility: HOSPITAL | Age: 65
End: 2025-05-23
Payer: MEDICARE

## 2025-05-23 ENCOUNTER — HOSPITAL ENCOUNTER (EMERGENCY)
Facility: HOSPITAL | Age: 65
Discharge: LEFT BEFORE DISCHARGE | End: 2025-05-24
Payer: MEDICARE

## 2025-05-23 ENCOUNTER — APPOINTMENT (OUTPATIENT)
Dept: CT IMAGING | Facility: HOSPITAL | Age: 65
End: 2025-05-23
Payer: MEDICARE

## 2025-05-23 VITALS
SYSTOLIC BLOOD PRESSURE: 173 MMHG | HEART RATE: 90 BPM | DIASTOLIC BLOOD PRESSURE: 91 MMHG | RESPIRATION RATE: 22 BRPM | BODY MASS INDEX: 24.12 KG/M2 | OXYGEN SATURATION: 95 % | WEIGHT: 182 LBS | HEIGHT: 73 IN | TEMPERATURE: 97.6 F

## 2025-05-23 DIAGNOSIS — R06.00 DYSPNEA, UNSPECIFIED TYPE: ICD-10-CM

## 2025-05-23 DIAGNOSIS — M54.31 RIGHT SIDED SCIATICA: Primary | ICD-10-CM

## 2025-05-23 LAB
ALBUMIN SERPL-MCNC: 4.2 G/DL (ref 3.5–5.2)
ALBUMIN/GLOB SERPL: 1.4 G/DL
ALP SERPL-CCNC: 53 U/L (ref 39–117)
ALT SERPL W P-5'-P-CCNC: 13 U/L (ref 1–41)
ANION GAP SERPL CALCULATED.3IONS-SCNC: 11.8 MMOL/L (ref 5–15)
AST SERPL-CCNC: 18 U/L (ref 1–40)
BASOPHILS # BLD AUTO: 0.06 10*3/MM3 (ref 0–0.2)
BASOPHILS NFR BLD AUTO: 0.4 % (ref 0–1.5)
BILIRUB SERPL-MCNC: 0.4 MG/DL (ref 0–1.2)
BUN SERPL-MCNC: 9 MG/DL (ref 8–23)
BUN/CREAT SERPL: 9.5 (ref 7–25)
CALCIUM SPEC-SCNC: 9.2 MG/DL (ref 8.6–10.5)
CHLORIDE SERPL-SCNC: 107 MMOL/L (ref 98–107)
CO2 SERPL-SCNC: 27.2 MMOL/L (ref 22–29)
CREAT SERPL-MCNC: 0.95 MG/DL (ref 0.76–1.27)
CRP SERPL-MCNC: <0.3 MG/DL (ref 0–0.5)
D-LACTATE SERPL-SCNC: 2.2 MMOL/L (ref 0.5–2)
DEPRECATED RDW RBC AUTO: 51.5 FL (ref 37–54)
EGFRCR SERPLBLD CKD-EPI 2021: 88.3 ML/MIN/1.73
EOSINOPHIL # BLD AUTO: 0.13 10*3/MM3 (ref 0–0.4)
EOSINOPHIL NFR BLD AUTO: 1 % (ref 0.3–6.2)
ERYTHROCYTE [DISTWIDTH] IN BLOOD BY AUTOMATED COUNT: 14.7 % (ref 12.3–15.4)
FLUAV RNA RESP QL NAA+PROBE: NOT DETECTED
FLUBV RNA RESP QL NAA+PROBE: NOT DETECTED
GLOBULIN UR ELPH-MCNC: 2.9 GM/DL
GLUCOSE SERPL-MCNC: 95 MG/DL (ref 65–99)
HCT VFR BLD AUTO: 41.6 % (ref 37.5–51)
HGB BLD-MCNC: 13 G/DL (ref 13–17.7)
HOLD SPECIMEN: NORMAL
HOLD SPECIMEN: NORMAL
IMM GRANULOCYTES # BLD AUTO: 0.05 10*3/MM3 (ref 0–0.05)
IMM GRANULOCYTES NFR BLD AUTO: 0.4 % (ref 0–0.5)
LYMPHOCYTES # BLD AUTO: 1.51 10*3/MM3 (ref 0.7–3.1)
LYMPHOCYTES NFR BLD AUTO: 11.2 % (ref 19.6–45.3)
MCH RBC QN AUTO: 29.9 PG (ref 26.6–33)
MCHC RBC AUTO-ENTMCNC: 31.3 G/DL (ref 31.5–35.7)
MCV RBC AUTO: 95.6 FL (ref 79–97)
MONOCYTES # BLD AUTO: 0.75 10*3/MM3 (ref 0.1–0.9)
MONOCYTES NFR BLD AUTO: 5.6 % (ref 5–12)
NEUTROPHILS NFR BLD AUTO: 10.96 10*3/MM3 (ref 1.7–7)
NEUTROPHILS NFR BLD AUTO: 81.4 % (ref 42.7–76)
NRBC BLD AUTO-RTO: 0 /100 WBC (ref 0–0.2)
NT-PROBNP SERPL-MCNC: 3981 PG/ML (ref 0–900)
PLATELET # BLD AUTO: 365 10*3/MM3 (ref 140–450)
PMV BLD AUTO: 9.5 FL (ref 6–12)
POTASSIUM SERPL-SCNC: 3.2 MMOL/L (ref 3.5–5.2)
PROCALCITONIN SERPL-MCNC: 0.07 NG/ML (ref 0–0.25)
PROT SERPL-MCNC: 7.1 G/DL (ref 6–8.5)
RBC # BLD AUTO: 4.35 10*6/MM3 (ref 4.14–5.8)
SARS-COV-2 RNA RESP QL NAA+PROBE: NOT DETECTED
SODIUM SERPL-SCNC: 146 MMOL/L (ref 136–145)
TROPONIN T SERPL HS-MCNC: 28 NG/L
WBC NRBC COR # BLD AUTO: 13.46 10*3/MM3 (ref 3.4–10.8)
WHOLE BLOOD HOLD COAG: NORMAL
WHOLE BLOOD HOLD SPECIMEN: NORMAL

## 2025-05-23 PROCEDURE — 36415 COLL VENOUS BLD VENIPUNCTURE: CPT

## 2025-05-23 PROCEDURE — 83605 ASSAY OF LACTIC ACID: CPT | Performed by: STUDENT IN AN ORGANIZED HEALTH CARE EDUCATION/TRAINING PROGRAM

## 2025-05-23 PROCEDURE — 80053 COMPREHEN METABOLIC PANEL: CPT

## 2025-05-23 PROCEDURE — 86140 C-REACTIVE PROTEIN: CPT | Performed by: STUDENT IN AN ORGANIZED HEALTH CARE EDUCATION/TRAINING PROGRAM

## 2025-05-23 PROCEDURE — 71045 X-RAY EXAM CHEST 1 VIEW: CPT

## 2025-05-23 PROCEDURE — 93005 ELECTROCARDIOGRAM TRACING: CPT

## 2025-05-23 PROCEDURE — 87636 SARSCOV2 & INF A&B AMP PRB: CPT

## 2025-05-23 PROCEDURE — 72131 CT LUMBAR SPINE W/O DYE: CPT

## 2025-05-23 PROCEDURE — 84145 PROCALCITONIN (PCT): CPT | Performed by: STUDENT IN AN ORGANIZED HEALTH CARE EDUCATION/TRAINING PROGRAM

## 2025-05-23 PROCEDURE — 83880 ASSAY OF NATRIURETIC PEPTIDE: CPT

## 2025-05-23 PROCEDURE — 87040 BLOOD CULTURE FOR BACTERIA: CPT | Performed by: STUDENT IN AN ORGANIZED HEALTH CARE EDUCATION/TRAINING PROGRAM

## 2025-05-23 PROCEDURE — 85025 COMPLETE CBC W/AUTO DIFF WBC: CPT

## 2025-05-23 PROCEDURE — 71250 CT THORAX DX C-: CPT

## 2025-05-23 PROCEDURE — 84484 ASSAY OF TROPONIN QUANT: CPT

## 2025-05-23 PROCEDURE — 99284 EMERGENCY DEPT VISIT MOD MDM: CPT

## 2025-05-23 RX ORDER — SODIUM CHLORIDE 0.9 % (FLUSH) 0.9 %
10 SYRINGE (ML) INJECTION AS NEEDED
Status: DISCONTINUED | OUTPATIENT
Start: 2025-05-23 | End: 2025-05-24 | Stop reason: HOSPADM

## 2025-05-23 RX ORDER — HYDROCODONE BITARTRATE AND ACETAMINOPHEN 10; 325 MG/1; MG/1
1 TABLET ORAL ONCE
Refills: 0 | Status: COMPLETED | OUTPATIENT
Start: 2025-05-23 | End: 2025-05-23

## 2025-05-23 RX ADMIN — HYDROCODONE BITARTRATE AND ACETAMINOPHEN 1 TABLET: 10; 325 TABLET ORAL at 23:19

## 2025-05-24 PROCEDURE — 71250 CT THORAX DX C-: CPT | Performed by: RADIOLOGY

## 2025-05-24 PROCEDURE — 72131 CT LUMBAR SPINE W/O DYE: CPT | Performed by: RADIOLOGY

## 2025-05-24 PROCEDURE — 71045 X-RAY EXAM CHEST 1 VIEW: CPT | Performed by: RADIOLOGY

## 2025-05-24 NOTE — ED NOTES
Pt informed tech that he refused his lab draw, and wanting to go home. I asked pt if he wanted to to leave A and he said yes. NP was informed and spoke with pt.

## 2025-05-24 NOTE — ED PROVIDER NOTES
Subjective   History of Present Illness  Patient is a 65-year-old male who presents today with complaints of right sided lower back pain with extension into his hip and down his right leg.  He reports a history of sciatica.  He reports he has had this pain for several years and it has worsened recently.  He denies any numbness or tingling or any loss of bowel or bladder.  He is able to ambulate without difficulty.  He reports he is also having shortness of breath for several months that has worsened over the last several days.  He is requesting hydrocodone pain medication and reports that he usually takes it at home as well as Klonopin but has not had it for several months because his primary care has not wrote it. He denies any chest pain/pressure/tightness, cough, congestion, or any known fever.  He is awake, alert, no acute distress upon his arrival.  He presents private vehicle with his wife.        Review of Systems   Constitutional: Negative.  Negative for fever.   HENT: Negative.  Negative for congestion.    Respiratory:  Positive for shortness of breath. Negative for cough and chest tightness.    Cardiovascular: Negative.  Negative for chest pain.   Gastrointestinal: Negative.  Negative for abdominal pain and nausea.   Endocrine: Negative.    Genitourinary: Negative.  Negative for dysuria.   Musculoskeletal:  Positive for back pain.   Skin: Negative.    Neurological: Negative.    Psychiatric/Behavioral: Negative.     All other systems reviewed and are negative.      Past Medical History:   Diagnosis Date    COPD (chronic obstructive pulmonary disease)     Hypertension        Allergies   Allergen Reactions    Codeine GI Intolerance    Demerol [Meperidine] Anxiety       No past surgical history on file.    No family history on file.    Social History     Socioeconomic History    Marital status:    Tobacco Use    Smoking status: Every Day     Current packs/day: 2.00     Types: Cigarettes   Vaping Use     Vaping status: Never Used   Substance and Sexual Activity    Alcohol use: No    Drug use: No    Sexual activity: Defer           Objective   Physical Exam  Vitals and nursing note reviewed.   Constitutional:       Appearance: He is well-developed.   Cardiovascular:      Rate and Rhythm: Normal rate and regular rhythm.   Pulmonary:      Effort: Pulmonary effort is normal.      Breath sounds: Examination of the left-upper field reveals rhonchi. Examination of the left-middle field reveals rhonchi. Examination of the left-lower field reveals rhonchi. Rhonchi present.   Abdominal:      General: Bowel sounds are normal.   Musculoskeletal:         General: Normal range of motion.      Cervical back: Normal range of motion.   Skin:     General: Skin is warm and dry.      Capillary Refill: Capillary refill takes 2 to 3 seconds.   Neurological:      General: No focal deficit present.      Mental Status: He is alert and oriented to person, place, and time.       Results for orders placed or performed during the hospital encounter of 05/23/25   ECG 12 Lead Other; shortness of breath    Collection Time: 05/23/25  9:56 PM   Result Value Ref Range    QT Interval 388 ms    QTC Interval 450 ms   Comprehensive Metabolic Panel    Collection Time: 05/23/25 10:59 PM    Specimen: Blood   Result Value Ref Range    Glucose 95 65 - 99 mg/dL    BUN 9 8 - 23 mg/dL    Creatinine 0.95 0.76 - 1.27 mg/dL    Sodium 146 (H) 136 - 145 mmol/L    Potassium 3.2 (L) 3.5 - 5.2 mmol/L    Chloride 107 98 - 107 mmol/L    CO2 27.2 22.0 - 29.0 mmol/L    Calcium 9.2 8.6 - 10.5 mg/dL    Total Protein 7.1 6.0 - 8.5 g/dL    Albumin 4.2 3.5 - 5.2 g/dL    ALT (SGPT) 13 1 - 41 U/L    AST (SGOT) 18 1 - 40 U/L    Alkaline Phosphatase 53 39 - 117 U/L    Total Bilirubin 0.4 0.0 - 1.2 mg/dL    Globulin 2.9 gm/dL    A/G Ratio 1.4 g/dL    BUN/Creatinine Ratio 9.5 7.0 - 25.0    Anion Gap 11.8 5.0 - 15.0 mmol/L    eGFR 88.3 >60.0 mL/min/1.73   BNP    Collection Time:  05/23/25 10:59 PM    Specimen: Blood   Result Value Ref Range    proBNP 3,981.0 (H) 0.0 - 900.0 pg/mL   High Sensitivity Troponin T    Collection Time: 05/23/25 10:59 PM    Specimen: Blood   Result Value Ref Range    HS Troponin T 28 (H) <22 ng/L   CBC Auto Differential    Collection Time: 05/23/25 10:59 PM    Specimen: Blood   Result Value Ref Range    WBC 13.46 (H) 3.40 - 10.80 10*3/mm3    RBC 4.35 4.14 - 5.80 10*6/mm3    Hemoglobin 13.0 13.0 - 17.7 g/dL    Hematocrit 41.6 37.5 - 51.0 %    MCV 95.6 79.0 - 97.0 fL    MCH 29.9 26.6 - 33.0 pg    MCHC 31.3 (L) 31.5 - 35.7 g/dL    RDW 14.7 12.3 - 15.4 %    RDW-SD 51.5 37.0 - 54.0 fl    MPV 9.5 6.0 - 12.0 fL    Platelets 365 140 - 450 10*3/mm3    Neutrophil % 81.4 (H) 42.7 - 76.0 %    Lymphocyte % 11.2 (L) 19.6 - 45.3 %    Monocyte % 5.6 5.0 - 12.0 %    Eosinophil % 1.0 0.3 - 6.2 %    Basophil % 0.4 0.0 - 1.5 %    Immature Grans % 0.4 0.0 - 0.5 %    Neutrophils, Absolute 10.96 (H) 1.70 - 7.00 10*3/mm3    Lymphocytes, Absolute 1.51 0.70 - 3.10 10*3/mm3    Monocytes, Absolute 0.75 0.10 - 0.90 10*3/mm3    Eosinophils, Absolute 0.13 0.00 - 0.40 10*3/mm3    Basophils, Absolute 0.06 0.00 - 0.20 10*3/mm3    Immature Grans, Absolute 0.05 0.00 - 0.05 10*3/mm3    nRBC 0.0 0.0 - 0.2 /100 WBC   C-reactive Protein    Collection Time: 05/23/25 10:59 PM    Specimen: Blood   Result Value Ref Range    C-Reactive Protein <0.30 0.00 - 0.50 mg/dL   Procalcitonin    Collection Time: 05/23/25 10:59 PM    Specimen: Blood   Result Value Ref Range    Procalcitonin 0.07 0.00 - 0.25 ng/mL   Lactic Acid, Plasma    Collection Time: 05/23/25 10:59 PM    Specimen: Blood   Result Value Ref Range    Lactate 2.2 (C) 0.5 - 2.0 mmol/L   Green Top (Gel)    Collection Time: 05/23/25 10:59 PM   Result Value Ref Range    Extra Tube Hold for add-ons.    Lavender Top    Collection Time: 05/23/25 10:59 PM   Result Value Ref Range    Extra Tube hold for add-on    Gold Top - SST    Collection Time: 05/23/25 10:59  PM   Result Value Ref Range    Extra Tube Hold for add-ons.    Light Blue Top    Collection Time: 05/23/25 10:59 PM   Result Value Ref Range    Extra Tube Hold for add-ons.    COVID-19 and FLU A/B PCR, 1 HR TAT - Swab, Nasopharynx    Collection Time: 05/23/25 11:00 PM    Specimen: Nasopharynx; Swab   Result Value Ref Range    COVID19 Not Detected Not Detected - Ref. Range    Influenza A PCR Not Detected Not Detected    Influenza B PCR Not Detected Not Detected     XR Chest 1 View  Result Date: 5/24/2025   Heart size at the upper limits of normal. Mild hypoinflated lungs No lobar consolidation or edema. No pleural effusion or pneumothorax. No fracture or foreign body.  This report was finalized on 5/24/2025 3:50 AM by Gil Jacobson MD.      CT Lumbar Spine Without Contrast  Result Date: 5/24/2025  Impression:  1.  40% L2 vertebral compression fracture, age indeterminate and likely chronic. 2.  Multilevel disc bulge and facet arthropathy from L1-2 to L5-S1, contributing to varying degrees of foraminal stenosis at all levels, most severe at L5-S1 with mild spinal canal stenosis at L3-4. 3.  Infrarenal fusiform aortic aneurysm measuring up to 3 cm in the greatest diameter.  This report was finalized on 5/24/2025 2:19 AM by Ovidio Healy MD.      CT Chest Without Contrast Diagnostic  Result Date: 5/24/2025  Impression:  1.  No focal infiltrate, pleural effusion or pneumothorax. 2.  COPD and emphysema. 3.  A 4 mm semisolid nodule in the right upper lobe. 4.  Bilateral renal cortical cysts. 5.  Bilateral adrenal adenomas.  This report was finalized on 5/24/2025 2:19 AM by Ovidio Healy MD.        Procedures           ED Course  ED Course as of 05/24/25 0625   Fri May 23, 2025   2211 ECG demonstrates sinus rhythm at a rate of 81 bpm.  There is a significant interference on the baseline which limits interpretation of intervals but AL interval is calculated at 110 ms, QTc interval calculated at 450 ms, and QRS duration at 94 ms.   There are no obvious ST or T wave changes [RA]      ED Course User Index  [RA] Gil Tafoya MD                                                       Medical Decision Making  Patient presented today with complaints of right sided sciatica pain as well as shortness of breath.  Once patient received medication for pain he reports that he is feeling much better and is demanding to leave.  Explained to patient that we would be doing further workup including lab results and CT scans.  Explained that his white count was slightly elevated and he should stay for further workup.  He declines and wants to leave.  Explained to patient that he would have to leave AGAINST MEDICAL ADVICE and if he did this he could go home and worsen including worsening shortness of breath, cardiac issues, including and up to possible death.  He verbalizes understanding and is still requesting to go home.  He is awake, alert, and able to answer all questions appropriately.  He is in no acute distress his wife is at bedside witnessing AMA conversation.  He has the mental capacity to make this decision.    Amount and/or Complexity of Data Reviewed  Labs: ordered.  Radiology: ordered.  ECG/medicine tests: ordered.    Risk  Prescription drug management.        Final diagnoses:   Right sided sciatica   Dyspnea, unspecified type       ED Disposition  ED Disposition       ED Disposition   AMA    Condition   --    Comment   --               No follow-up provider specified.       Medication List      No changes were made to your prescriptions during this visit.            Kitty Gresham, APRN  05/24/25 2881

## 2025-05-26 LAB
QT INTERVAL: 388 MS
QTC INTERVAL: 450 MS

## 2025-05-28 LAB
BACTERIA SPEC AEROBE CULT: NORMAL
BACTERIA SPEC AEROBE CULT: NORMAL